# Patient Record
Sex: MALE | Race: WHITE | NOT HISPANIC OR LATINO | Employment: FULL TIME | URBAN - NONMETROPOLITAN AREA
[De-identification: names, ages, dates, MRNs, and addresses within clinical notes are randomized per-mention and may not be internally consistent; named-entity substitution may affect disease eponyms.]

---

## 2017-02-06 ENCOUNTER — TELEPHONE (OUTPATIENT)
Dept: URGENT CARE | Facility: CLINIC | Age: 22
End: 2017-02-06

## 2017-02-06 NOTE — TELEPHONE ENCOUNTER
Contacted patient regarding A1C results, a copy of results sent to family doctor Manuel TAVERAS in [place]. Verbalized understanding

## 2017-02-22 ENCOUNTER — TELEPHONE (OUTPATIENT)
Dept: URGENT CARE | Facility: CLINIC | Age: 22
End: 2017-02-22

## 2017-02-22 NOTE — TELEPHONE ENCOUNTER
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 Have him check to see if student health can do that to decrease patient cost.

## 2018-01-12 ENCOUNTER — APPOINTMENT (OUTPATIENT)
Dept: GENERAL RADIOLOGY | Facility: HOSPITAL | Age: 23
End: 2018-01-12

## 2018-01-12 ENCOUNTER — HOSPITAL ENCOUNTER (EMERGENCY)
Facility: HOSPITAL | Age: 23
Discharge: HOME | End: 2018-01-12
Attending: EMERGENCY MEDICINE | Admitting: EMERGENCY MEDICINE

## 2018-01-12 VITALS
BODY MASS INDEX: 33.75 KG/M2 | OXYGEN SATURATION: 100 % | DIASTOLIC BLOOD PRESSURE: 94 MMHG | HEIGHT: 66 IN | WEIGHT: 210 LBS | SYSTOLIC BLOOD PRESSURE: 141 MMHG | RESPIRATION RATE: 18 BRPM | TEMPERATURE: 97.9 F | HEART RATE: 107 BPM

## 2018-01-12 DIAGNOSIS — F10.129 HANGOVER WITH COMPLICATION (HCC): ICD-10-CM

## 2018-01-12 DIAGNOSIS — E86.0 DEHYDRATION: ICD-10-CM

## 2018-01-12 DIAGNOSIS — R11.2 NON-INTRACTABLE VOMITING WITH NAUSEA, UNSPECIFIED VOMITING TYPE: Primary | ICD-10-CM

## 2018-01-12 LAB
ALBUMIN SERPL-MCNC: 5.3 G/DL (ref 3.5–5)
ALBUMIN/GLOB SERPL: 1.7 G/DL (ref 1–2)
ALP SERPL-CCNC: 114 U/L (ref 38–126)
ALT SERPL W P-5'-P-CCNC: 49 U/L (ref 13–69)
ANION GAP SERPL CALCULATED.3IONS-SCNC: 19.7 MMOL/L
AST SERPL-CCNC: 28 U/L (ref 15–46)
BACTERIA UR QL AUTO: ABNORMAL /HPF
BASOPHILS # BLD AUTO: 0.06 10*3/MM3 (ref 0–0.2)
BASOPHILS NFR BLD AUTO: 0.3 % (ref 0–2.5)
BILIRUB SERPL-MCNC: 0.7 MG/DL (ref 0.2–1.3)
BILIRUB UR QL STRIP: NEGATIVE
BUN BLD-MCNC: 20 MG/DL (ref 7–20)
BUN/CREAT SERPL: 22.2 (ref 6.3–21.9)
CALCIUM SPEC-SCNC: 10.5 MG/DL (ref 8.4–10.2)
CHLORIDE SERPL-SCNC: 101 MMOL/L (ref 98–107)
CLARITY UR: CLEAR
CO2 SERPL-SCNC: 29 MMOL/L (ref 26–30)
COLOR UR: YELLOW
CREAT BLD-MCNC: 0.9 MG/DL (ref 0.6–1.3)
DEPRECATED RDW RBC AUTO: 38.5 FL (ref 37–54)
EOSINOPHIL # BLD AUTO: 0 10*3/MM3 (ref 0–0.7)
EOSINOPHIL NFR BLD AUTO: 0 % (ref 0–7)
ERYTHROCYTE [DISTWIDTH] IN BLOOD BY AUTOMATED COUNT: 11.9 % (ref 11.5–14.5)
GFR SERPL CREATININE-BSD FRML MDRD: 106 ML/MIN/1.73
GLOBULIN UR ELPH-MCNC: 3.2 GM/DL
GLUCOSE BLD-MCNC: 95 MG/DL (ref 74–98)
GLUCOSE UR STRIP-MCNC: NEGATIVE MG/DL
HCT VFR BLD AUTO: 47.2 % (ref 42–52)
HGB BLD-MCNC: 16.1 G/DL (ref 14–18)
HGB UR QL STRIP.AUTO: ABNORMAL
HYALINE CASTS UR QL AUTO: ABNORMAL /LPF
IMM GRANULOCYTES # BLD: 0.12 10*3/MM3 (ref 0–0.06)
IMM GRANULOCYTES NFR BLD: 0.5 % (ref 0–0.6)
KETONES UR QL STRIP: ABNORMAL
LEUKOCYTE ESTERASE UR QL STRIP.AUTO: NEGATIVE
LIPASE SERPL-CCNC: 61 U/L (ref 23–300)
LYMPHOCYTES # BLD AUTO: 1.12 10*3/MM3 (ref 0.6–3.4)
LYMPHOCYTES NFR BLD AUTO: 4.9 % (ref 10–50)
MCH RBC QN AUTO: 30.1 PG (ref 27–31)
MCHC RBC AUTO-ENTMCNC: 34.1 G/DL (ref 30–37)
MCV RBC AUTO: 88.2 FL (ref 80–94)
MONOCYTES # BLD AUTO: 0.7 10*3/MM3 (ref 0–0.9)
MONOCYTES NFR BLD AUTO: 3.1 % (ref 0–12)
MUCOUS THREADS URNS QL MICRO: ABNORMAL /HPF
NEUTROPHILS # BLD AUTO: 20.87 10*3/MM3 (ref 2–6.9)
NEUTROPHILS NFR BLD AUTO: 91.2 % (ref 37–80)
NITRITE UR QL STRIP: NEGATIVE
NRBC BLD MANUAL-RTO: 0 /100 WBC (ref 0–0)
PH UR STRIP.AUTO: 5.5 [PH] (ref 5–8)
PLATELET # BLD AUTO: 371 10*3/MM3 (ref 130–400)
PMV BLD AUTO: 9.2 FL (ref 6–12)
POTASSIUM BLD-SCNC: 4.7 MMOL/L (ref 3.5–5.1)
PROT SERPL-MCNC: 8.5 G/DL (ref 6.3–8.2)
PROT UR QL STRIP: ABNORMAL
RBC # BLD AUTO: 5.35 10*6/MM3 (ref 4.7–6.1)
RBC # UR: ABNORMAL /HPF
REF LAB TEST METHOD: ABNORMAL
SODIUM BLD-SCNC: 145 MMOL/L (ref 137–145)
SP GR UR STRIP: >=1.03 (ref 1–1.03)
SQUAMOUS #/AREA URNS HPF: ABNORMAL /HPF
UROBILINOGEN UR QL STRIP: ABNORMAL
WBC NRBC COR # BLD: 22.87 10*3/MM3 (ref 4.8–10.8)
WBC UR QL AUTO: ABNORMAL /HPF

## 2018-01-12 PROCEDURE — 81001 URINALYSIS AUTO W/SCOPE: CPT | Performed by: PHYSICIAN ASSISTANT

## 2018-01-12 PROCEDURE — 99283 EMERGENCY DEPT VISIT LOW MDM: CPT

## 2018-01-12 PROCEDURE — 80053 COMPREHEN METABOLIC PANEL: CPT | Performed by: PHYSICIAN ASSISTANT

## 2018-01-12 PROCEDURE — 83690 ASSAY OF LIPASE: CPT | Performed by: PHYSICIAN ASSISTANT

## 2018-01-12 PROCEDURE — 25010000002 ONDANSETRON PER 1 MG: Performed by: PHYSICIAN ASSISTANT

## 2018-01-12 PROCEDURE — 71046 X-RAY EXAM CHEST 2 VIEWS: CPT

## 2018-01-12 PROCEDURE — 96374 THER/PROPH/DIAG INJ IV PUSH: CPT

## 2018-01-12 PROCEDURE — 85025 COMPLETE CBC W/AUTO DIFF WBC: CPT | Performed by: PHYSICIAN ASSISTANT

## 2018-01-12 RX ORDER — ONDANSETRON 2 MG/ML
4 INJECTION INTRAMUSCULAR; INTRAVENOUS ONCE
Status: COMPLETED | OUTPATIENT
Start: 2018-01-12 | End: 2018-01-12

## 2018-01-12 RX ORDER — ONDANSETRON 4 MG/1
4 TABLET, ORALLY DISINTEGRATING ORAL EVERY 6 HOURS PRN
Qty: 10 TABLET | Refills: 0 | Status: SHIPPED | OUTPATIENT
Start: 2018-01-12 | End: 2018-05-25

## 2018-01-12 RX ORDER — SODIUM CHLORIDE 0.9 % (FLUSH) 0.9 %
10 SYRINGE (ML) INJECTION AS NEEDED
Status: DISCONTINUED | OUTPATIENT
Start: 2018-01-12 | End: 2018-01-12 | Stop reason: HOSPADM

## 2018-01-12 RX ADMIN — ONDANSETRON 4 MG: 2 INJECTION INTRAMUSCULAR; INTRAVENOUS at 15:32

## 2018-01-12 RX ADMIN — SODIUM CHLORIDE 1000 ML: 9 INJECTION, SOLUTION INTRAVENOUS at 15:32

## 2018-01-12 NOTE — ED PROVIDER NOTES
Subjective   HPI Comments: 22-year-old male with no significant health problems other than allergies.  He is here for generalized weakness and vomiting since last night after drinking 6 cocktails and getting drunk.  States that he vomited more than 10 times.  No blood in the vomitus.  No abdominal pain.  No diarrhea.  Still has nausea today.  No headache.  Symptoms are progressively worsening.    Aggravating factors: Eating or drinking.  Alleviating factors: None.  Treatment prior to arrival: Powerade.      History provided by:  Patient  History limited by: nothing.   used: No        Review of Systems   Constitutional: Positive for appetite change.        Generalized weakness   HENT: Negative.    Eyes: Negative.    Respiratory: Negative.    Cardiovascular: Negative.  Negative for chest pain.   Gastrointestinal: Positive for nausea and vomiting. Negative for abdominal pain.   Endocrine: Negative.    Genitourinary: Negative for dysuria.   Musculoskeletal: Negative.    Skin: Negative.    Allergic/Immunologic: Negative.    Neurological: Negative.    Hematological: Negative.    Psychiatric/Behavioral: Negative.    All other systems reviewed and are negative.      Past Medical History:   Diagnosis Date   • ADHD (attention deficit hyperactivity disorder)        No Known Allergies    Past Surgical History:   Procedure Laterality Date   • SHOULDER SURGERY Left    • TONSILLECTOMY         History reviewed. No pertinent family history.    Social History     Social History   • Marital status: Single     Spouse name: N/A   • Number of children: N/A   • Years of education: N/A     Social History Main Topics   • Smoking status: Never Smoker   • Smokeless tobacco: None   • Alcohol use Yes   • Drug use: Defer   • Sexual activity: Defer     Other Topics Concern   • None     Social History Narrative   • None           Objective   Physical Exam   Constitutional: He is oriented to person, place, and time. He appears  well-developed and well-nourished. No distress.   HENT:   Head: Normocephalic and atraumatic.   Right Ear: External ear normal.   Left Ear: External ear normal.   Oral mucosa is dry.   Eyes: EOM are normal. Pupils are equal, round, and reactive to light.   Neck: Normal range of motion. Neck supple.   Cardiovascular: Normal rate, regular rhythm and normal heart sounds.    Pulmonary/Chest: Effort normal and breath sounds normal. No stridor. He has no wheezes. He exhibits no tenderness.   Abdominal: Soft. He exhibits no distension. There is no tenderness. There is no rebound and no guarding.   Musculoskeletal: Normal range of motion. He exhibits no edema.   Neurological: He is alert and oriented to person, place, and time.   Skin: Skin is warm and dry. No rash noted. He is not diaphoretic.   Psychiatric: He has a normal mood and affect. His behavior is normal. Judgment and thought content normal.   Nursing note and vitals reviewed.      Procedures         ED Course  ED Course   Comment By Time   Heart rate 89 to 90.  Patient feels much better.  No longer nauseated or vomiting.  White count noted to be elevated.  Chest x-ray and urinalysis as well as lipase added on. Earlene Monae PA-C 01/12 1618   Laboratory evaluation noted.  Leukocytosis is noted.  Leukocytosis may be from vomiting.  The patient denies abdominal pain.  Negative chest x-ray and urinalysis.  Describes no infection symptoms.  Patient understands what to return for as I explained to return for signs and sx listed in the discharge instructions. Earlene Monae PA-C 01/12 1710   Abdomen reexamined.  No abdominal pain or abdominal tenderness. Earlene Monae PA-C 01/12 1729                  MDM  Number of Diagnoses or Management Options  Dehydration: new and requires workup  Hangover with complication: new and requires workup  Non-intractable vomiting with nausea, unspecified vomiting type: new and requires workup     Amount and/or Complexity of Data  Reviewed  Clinical lab tests: ordered and reviewed  Tests in the radiology section of CPT®: ordered and reviewed  Independent visualization of images, tracings, or specimens: yes    Risk of Complications, Morbidity, and/or Mortality  Presenting problems: moderate  Diagnostic procedures: low  Management options: moderate  General comments: Patient likely has a hangover effect from alcohol last night and dehydration.  He feels much improved following fluids and Zofran.  We will discharge with Zofran and instructions to return if worse.  He understands he has a trace of blood in his urine and will need to see [facility] for a repeat urinalysis at some point.  As mentioned in my note above, leukocytosis may be from vomiting as no signs of infection are noted in his urine, chest x-ray.  He describes no infection like symptoms.    Patient Progress  Patient progress: stable      Final diagnoses:   Non-intractable vomiting with nausea, unspecified vomiting type   Hangover with complication   Dehydration            Earlene Monae PA-C  01/12/18 1712       Earlene Monae PA-C  01/12/18 4601

## 2018-01-12 NOTE — DISCHARGE INSTRUCTIONS
Discontinue alcohol.    Return to the ER for severe abdominal pain, vomiting blood, passing blood through your bowels or having black tarry stool, intractable vomiting, new or worsening symptoms.    Increase fluids at home.    Follow-up with your doctor or student health tomorrow for reexamination.

## 2018-04-20 ENCOUNTER — HOSPITAL ENCOUNTER (EMERGENCY)
Facility: HOSPITAL | Age: 23
Discharge: HOME | End: 2018-04-21
Attending: STUDENT IN AN ORGANIZED HEALTH CARE EDUCATION/TRAINING PROGRAM | Admitting: STUDENT IN AN ORGANIZED HEALTH CARE EDUCATION/TRAINING PROGRAM

## 2018-04-20 DIAGNOSIS — S05.02XA CONJUNCTIVAL ABRASION, LEFT, INITIAL ENCOUNTER: Primary | ICD-10-CM

## 2018-04-20 PROCEDURE — 99283 EMERGENCY DEPT VISIT LOW MDM: CPT

## 2018-04-20 RX ORDER — ERYTHROMYCIN 5 MG/G
1 OINTMENT OPHTHALMIC ONCE
Status: COMPLETED | OUTPATIENT
Start: 2018-04-20 | End: 2018-04-21

## 2018-04-21 VITALS
WEIGHT: 201.2 LBS | HEART RATE: 80 BPM | DIASTOLIC BLOOD PRESSURE: 90 MMHG | TEMPERATURE: 98.7 F | OXYGEN SATURATION: 97 % | HEIGHT: 67 IN | RESPIRATION RATE: 16 BRPM | BODY MASS INDEX: 31.58 KG/M2 | SYSTOLIC BLOOD PRESSURE: 129 MMHG

## 2018-04-21 RX ORDER — ERYTHROMYCIN 5 MG/G
OINTMENT OPHTHALMIC
Qty: 3.5 G | Refills: 0 | Status: SHIPPED | OUTPATIENT
Start: 2018-04-21 | End: 2018-05-25

## 2018-04-21 RX ADMIN — ERYTHROMYCIN 1 APPLICATION: 5 OINTMENT OPHTHALMIC at 00:08

## 2018-04-21 NOTE — ED PROVIDER NOTES
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  Patient does report some pain and burning and initially he stated that his vision was a little cloudy but currently is normal.  He states it does not hurt that bad but feels more irritated than anything else.  No bleeding.            Review of Systems   All other systems reviewed and are negative.      Past Medical History:   Diagnosis Date   • ADHD (attention deficit hyperactivity disorder)    • Seasonal allergies        No Known Allergies    Past Surgical History:   Procedure Laterality Date   • SHOULDER SURGERY Left    • TONSILLECTOMY         Family History   Problem Relation Age of Onset   • No Known Problems Mother    • No Known Problems Father        Social History     Social History   • Marital status:      Social History Main Topics   • Smoking status: Never Smoker   • Smokeless tobacco: Never Used   • Alcohol use No   • Drug use: No   • Sexual activity: Defer     Other Topics Concern   • Not on file           Objective   Physical Exam   Nursing note and vitals reviewed.    GEN: No acute distress  Head: Normocephalic, atraumatic  Eyes: Pupils equal round reactive to light, extraocular movements are intact, with eyes deviated to the left on the lateral aspect of the left eye the conjunctiva is mildly erythematous with an abrasion is visible.  Mild signs of subconjunctival hemorrhage around the abrasion.  ENT: Posterior pharynx normal in appearance, oral mucosa is moist  Chest: Nontender to palpation  Cardiovascular: Regular rate  Lungs: Clear to auscultation bilaterally  Abdomen: Soft, nontender, nondistended, no peritoneal signs  Extremities: No edema, normal appearance  Neuro: GCS 15  Psych: Mood and affect are appropriate    Procedures         ED Course  ED Course                  MDM  Number of Diagnoses or Management Options  Conjunctival  abrasion, left, initial encounter:   Diagnosis management comments: Visual acuity grossly normal.  We will give the patient referral to ophthalmology on Monday if not improving.  We will treat with erythromycin eye ointment.      Final diagnoses:   Conjunctival abrasion, left, initial encounter            Stalin Mcmahon MD  04/21/18 0014

## 2018-05-28 VITALS
DIASTOLIC BLOOD PRESSURE: 78 MMHG | WEIGHT: 205.03 LBS | RESPIRATION RATE: 20 BRPM | HEART RATE: 100 BPM | SYSTOLIC BLOOD PRESSURE: 130 MMHG | HEIGHT: 67 IN | TEMPERATURE: 98.4 F | BODY MASS INDEX: 32.18 KG/M2 | OXYGEN SATURATION: 96 %

## 2018-05-28 PROCEDURE — 99283 EMERGENCY DEPT VISIT LOW MDM: CPT

## 2018-05-29 ENCOUNTER — HOSPITAL ENCOUNTER (EMERGENCY)
Facility: HOSPITAL | Age: 23
Discharge: HOME | End: 2018-05-29
Attending: EMERGENCY MEDICINE | Admitting: EMERGENCY MEDICINE

## 2018-05-29 DIAGNOSIS — B02.9 HERPES ZOSTER WITHOUT COMPLICATION: Primary | ICD-10-CM

## 2018-05-29 RX ORDER — VALACYCLOVIR HYDROCHLORIDE 1 G/1
1000 TABLET, FILM COATED ORAL 3 TIMES DAILY
Qty: 21 TABLET | Refills: 0 | OUTPATIENT
Start: 2018-05-29 | End: 2018-12-02 | Stop reason: HOSPADM

## 2018-05-29 RX ORDER — NAPROXEN 500 MG/1
500 TABLET ORAL 2 TIMES DAILY PRN
Qty: 20 TABLET | Refills: 0 | OUTPATIENT
Start: 2018-05-29 | End: 2018-12-02 | Stop reason: HOSPADM

## 2018-05-29 RX ORDER — IBUPROFEN 800 MG/1
800 TABLET ORAL ONCE
Status: COMPLETED | OUTPATIENT
Start: 2018-05-29 | End: 2018-05-29

## 2018-05-29 RX ORDER — VALACYCLOVIR HYDROCHLORIDE 500 MG/1
1000 TABLET, FILM COATED ORAL ONCE
Status: COMPLETED | OUTPATIENT
Start: 2018-05-29 | End: 2018-05-29

## 2018-05-29 RX ADMIN — IBUPROFEN 800 MG: 800 TABLET ORAL at 01:27

## 2018-05-29 RX ADMIN — VALACYCLOVIR 1000 MG: 500 TABLET, FILM COATED ORAL at 01:28

## 2018-12-02 ENCOUNTER — HOSPITAL ENCOUNTER (EMERGENCY)
Facility: HOSPITAL | Age: 23
Discharge: HOME | End: 2018-12-02
Attending: STUDENT IN AN ORGANIZED HEALTH CARE EDUCATION/TRAINING PROGRAM | Admitting: STUDENT IN AN ORGANIZED HEALTH CARE EDUCATION/TRAINING PROGRAM

## 2018-12-02 VITALS
RESPIRATION RATE: 16 BRPM | DIASTOLIC BLOOD PRESSURE: 75 MMHG | HEART RATE: 75 BPM | SYSTOLIC BLOOD PRESSURE: 128 MMHG | WEIGHT: 210 LBS | HEIGHT: 66 IN | BODY MASS INDEX: 33.75 KG/M2 | OXYGEN SATURATION: 100 % | TEMPERATURE: 98 F

## 2018-12-02 DIAGNOSIS — F10.920 ALCOHOLIC INTOXICATION WITHOUT COMPLICATION (HCC): Primary | ICD-10-CM

## 2018-12-02 DIAGNOSIS — R11.2 NON-INTRACTABLE VOMITING WITH NAUSEA, UNSPECIFIED VOMITING TYPE: ICD-10-CM

## 2018-12-02 PROCEDURE — 99283 EMERGENCY DEPT VISIT LOW MDM: CPT

## 2018-12-02 RX ORDER — PROMETHAZINE HYDROCHLORIDE 25 MG/1
25 SUPPOSITORY RECTAL ONCE
Status: DISCONTINUED | OUTPATIENT
Start: 2018-12-02 | End: 2018-12-02

## 2018-12-02 RX ORDER — ONDANSETRON 4 MG/1
4 TABLET, ORALLY DISINTEGRATING ORAL EVERY 6 HOURS PRN
Qty: 8 TABLET | Refills: 0 | Status: SHIPPED | OUTPATIENT
Start: 2018-12-02 | End: 2019-09-19 | Stop reason: ALTCHOICE

## 2018-12-02 RX ORDER — ONDANSETRON 4 MG/1
8 TABLET, ORALLY DISINTEGRATING ORAL ONCE
Status: COMPLETED | OUTPATIENT
Start: 2018-12-02 | End: 2018-12-02

## 2018-12-02 RX ADMIN — ONDANSETRON 8 MG: 4 TABLET, ORALLY DISINTEGRATING ORAL at 04:37

## 2018-12-02 NOTE — ED PROVIDER NOTES
Subjective   23-year-old male that presents with significant other with concerns for alcohol poisoning.  They went to a [email or web address] party earlier tonight where he had 4 bourbon drinks.  Patient's significant other states that he is been acting funny and became nauseous.  She was afraid he would have alcohol poisoning and die.  The patient is awake alert and despite being intoxicated is oriented            Review of Systems   All other systems reviewed and are negative.      Past Medical History:   Diagnosis Date   • ADHD (attention deficit hyperactivity disorder)    • Seasonal allergies        No Known Allergies    Past Surgical History:   Procedure Laterality Date   • SHOULDER SURGERY Left    • TONSILLECTOMY         Family History   Problem Relation Age of Onset   • No Known Problems Mother    • No Known Problems Father        Social History     Socioeconomic History   • Marital status:      Spouse name: Not on file   • Number of children: Not on file   • Years of education: Not on file   • Highest education level: Not on file   Tobacco Use   • Smoking status: Never Smoker   • Smokeless tobacco: Never Used   Substance and Sexual Activity   • Alcohol use: No   • Drug use: No   • Sexual activity: Defer           Objective   Physical Exam   Nursing note and vitals reviewed.  .  GEN: No acute distress  Head: Normocephalic, atraumatic  Eyes: Pupils equal round reactive to light  ENT: Posterior pharynx normal in appearance, oral mucosa is moist  Chest: Nontender to palpation  Cardiovascular: Regular rate  Lungs: Clear to auscultation bilaterally  Abdomen: Soft, nontender, nondistended, no peritoneal signs  Extremities: No edema, normal appearance  Neuro: GCS 15  Psych: Pleasantly intoxicated    Procedures           ED Course                  MDM  Number of Diagnoses or Management Options  Alcoholic intoxication without complication (CMS/HCC):   Non-intractable vomiting with nausea, unspecified vomiting type:    Diagnosis management comments: Pt does appear intoxicated but is in no life-threatening distress.  Did give the patient oral Zofran.  Did  the patient and his significant other on the nature of alcohol toxicity.       Amount and/or Complexity of Data Reviewed  Decide to obtain previous medical records or to obtain history from someone other than the patient: yes  Obtain history from someone other than the patient: yes          Final diagnoses:   Alcoholic intoxication without complication (CMS/HCC)   Non-intractable vomiting with nausea, unspecified vomiting type            Stalin Mcmahon MD  12/02/18 0606

## 2019-07-13 ENCOUNTER — HOSPITAL ENCOUNTER (EMERGENCY)
Facility: HOSPITAL | Age: 24
Discharge: HOME | End: 2019-07-13
Attending: EMERGENCY MEDICINE | Admitting: EMERGENCY MEDICINE

## 2019-07-13 VITALS
RESPIRATION RATE: 16 BRPM | HEART RATE: 88 BPM | SYSTOLIC BLOOD PRESSURE: 150 MMHG | OXYGEN SATURATION: 97 % | HEIGHT: 67 IN | DIASTOLIC BLOOD PRESSURE: 92 MMHG | TEMPERATURE: 98.1 F | WEIGHT: 220 LBS | BODY MASS INDEX: 34.53 KG/M2

## 2019-07-13 DIAGNOSIS — L25.8 CONTACT DERMATITIS DUE TO OTHER AGENT, UNSPECIFIED CONTACT DERMATITIS TYPE: Primary | ICD-10-CM

## 2019-07-13 PROCEDURE — 99283 EMERGENCY DEPT VISIT LOW MDM: CPT

## 2019-07-13 PROCEDURE — 63710000001 PREDNISONE PER 1 MG: Performed by: PHYSICIAN ASSISTANT

## 2019-07-13 RX ORDER — CETIRIZINE HYDROCHLORIDE 10 MG/1
10 TABLET ORAL DAILY
Qty: 14 TABLET | Refills: 0 | Status: SHIPPED | OUTPATIENT
Start: 2019-07-13 | End: 2019-09-19 | Stop reason: ALTCHOICE

## 2019-07-13 RX ORDER — PREDNISONE 20 MG/1
40 TABLET ORAL ONCE
Status: COMPLETED | OUTPATIENT
Start: 2019-07-13 | End: 2019-07-13

## 2019-07-13 RX ORDER — CETIRIZINE HYDROCHLORIDE 10 MG/1
10 TABLET ORAL ONCE
Status: COMPLETED | OUTPATIENT
Start: 2019-07-13 | End: 2019-07-13

## 2019-07-13 RX ORDER — FENOPROFEN CALCIUM 200 MG
CAPSULE ORAL 2 TIMES DAILY
Qty: 118 ML | Refills: 0 | Status: SHIPPED | OUTPATIENT
Start: 2019-07-13 | End: 2019-07-18

## 2019-07-13 RX ADMIN — PREDNISONE 40 MG: 20 TABLET ORAL at 18:05

## 2019-07-13 RX ADMIN — CETIRIZINE HYDROCHLORIDE 10 MG: 10 TABLET, FILM COATED ORAL at 18:05

## 2019-07-13 NOTE — ED PROVIDER NOTES
Subjective   Patient is here with complaint of some rash to the right forearm since 09 100 this morning has cleansed the skin since then, feels this may be related to some chemical he was using yesterday but is uncertain presents here for further evaluation this is not a pruritic rash no fevers chills no other systemic complaints and localized to the dorsal aspect right forearm in  separate areas        History provided by:  Patient and spouse      Review of Systems   Constitutional: Negative.    HENT: Negative.    Respiratory: Negative.    Cardiovascular: Negative.    Gastrointestinal: Negative.    Musculoskeletal: Negative.    Skin: Positive for rash.   Neurological: Negative.    All other systems reviewed and are negative.      Past Medical History:   Diagnosis Date   • ADHD (attention deficit hyperactivity disorder)    • Seasonal allergies        No Known Allergies    Past Surgical History:   Procedure Laterality Date   • SHOULDER SURGERY Left    • TONSILLECTOMY         Family History   Problem Relation Age of Onset   • No Known Problems Mother    • No Known Problems Father        Social History     Socioeconomic History   • Marital status:      Spouse name: Not on file   • Number of children: Not on file   • Years of education: Not on file   • Highest education level: Not on file   Tobacco Use   • Smoking status: Never Smoker   • Smokeless tobacco: Never Used   Substance and Sexual Activity   • Alcohol use: No   • Drug use: No   • Sexual activity: Defer           Objective   Physical Exam   Constitutional: He is oriented to person, place, and time. He appears well-developed and well-nourished.   Afebrile nontoxic no acute distress   HENT:   Head: Normocephalic and atraumatic.   Eyes: Conjunctivae and EOM are normal. Pupils are equal, round, and reactive to light.   Neck: Normal range of motion.   Cardiovascular: Normal rate and regular rhythm.   Pulmonary/Chest: Effort normal and breath sounds normal.    Musculoskeletal: Normal range of motion.   Neurological: He is alert and oriented to person, place, and time. No cranial nerve deficit or sensory deficit. He exhibits normal muscle tone. Coordination normal.   Skin: Skin is warm and dry. Rash noted. No pallor.   Localized slight erythematous lesions x 3 dorsum r forearm   Psychiatric: He has a normal mood and affect. His behavior is normal. Judgment and thought content normal.   Nursing note and vitals reviewed.      Procedures           ED Course  ED Course as of Jul 13 1758   Sat Jul 13, 2019 1756 We will plan on treating for dermatitis steroids Benadryl topical creams of the same follow-up with PCP return here for any worsening symptoms or concerns otherwise  [SC]      ED Course User Index  [SC] Kevin Iqbal PA-C                  MDM  Number of Diagnoses or Management Options     Amount and/or Complexity of Data Reviewed  Review and summarize past medical records: yes  Discuss the patient with other providers: yes    Risk of Complications, Morbidity, and/or Mortality  Presenting problems: low  Diagnostic procedures: low  Management options: low          Final diagnoses:   Contact dermatitis due to other agent, unspecified contact dermatitis type            Kevin Iqbal PA-C  07/13/19 1758       Kevin Iqbal PA-C  07/13/19 1758

## 2020-02-18 ENCOUNTER — HOSPITAL ENCOUNTER (EMERGENCY)
Facility: HOSPITAL | Age: 25
Discharge: HOME | End: 2020-02-18
Attending: STUDENT IN AN ORGANIZED HEALTH CARE EDUCATION/TRAINING PROGRAM | Admitting: STUDENT IN AN ORGANIZED HEALTH CARE EDUCATION/TRAINING PROGRAM

## 2020-02-18 VITALS
HEART RATE: 83 BPM | TEMPERATURE: 98.3 F | OXYGEN SATURATION: 97 % | SYSTOLIC BLOOD PRESSURE: 131 MMHG | BODY MASS INDEX: 33.34 KG/M2 | WEIGHT: 212.4 LBS | DIASTOLIC BLOOD PRESSURE: 90 MMHG | RESPIRATION RATE: 18 BRPM | HEIGHT: 67 IN

## 2020-02-18 DIAGNOSIS — R21 RASH: Primary | ICD-10-CM

## 2020-02-18 PROCEDURE — 99282 EMERGENCY DEPT VISIT SF MDM: CPT

## 2020-02-18 RX ORDER — PREDNISONE 20 MG/1
40 TABLET ORAL DAILY
Qty: 10 TABLET | Refills: 0 | Status: SHIPPED | OUTPATIENT
Start: 2020-02-18 | End: 2020-02-23

## 2020-02-18 RX ORDER — ACYCLOVIR 400 MG/1
800 TABLET ORAL
Qty: 70 TABLET | Refills: 0 | Status: SHIPPED | OUTPATIENT
Start: 2020-02-18 | End: 2020-02-25

## 2020-02-19 NOTE — ED PROVIDER NOTES
Subjective   This patient states he has had a rash to his left posterior shoulder area which is pruritic and burning.  He states he had shingles before and this feels like that.  He denies rash anywhere else.  No fever.  No new soaps or detergents.          Review of Systems   Constitutional: Negative.    HENT: Negative.    Eyes: Negative.    Respiratory: Negative.    Cardiovascular: Negative.    Gastrointestinal: Negative.    Genitourinary: Negative.    Musculoskeletal: Negative.    Skin:        Rash to the left posterior shoulder area   Allergic/Immunologic: Negative.    Neurological: Negative.    Psychiatric/Behavioral: Negative.    All other systems reviewed and are negative.      Past Medical History:   Diagnosis Date   • ADHD (attention deficit hyperactivity disorder)    • Seasonal allergies        No Known Allergies    Past Surgical History:   Procedure Laterality Date   • SHOULDER SURGERY Left    • TONSILLECTOMY         Family History   Problem Relation Age of Onset   • No Known Problems Mother    • No Known Problems Father        Social History     Socioeconomic History   • Marital status:      Spouse name: Not on file   • Number of children: Not on file   • Years of education: Not on file   • Highest education level: Not on file   Tobacco Use   • Smoking status: Never Smoker   • Smokeless tobacco: Never Used   Substance and Sexual Activity   • Alcohol use: No   • Drug use: No   • Sexual activity: Defer           Objective   Physical Exam   Constitutional: He appears well-developed and well-nourished.   HENT:   Head: Normocephalic and atraumatic.   Neck: Normal range of motion.   Cardiovascular: Normal rate.   Pulmonary/Chest: Effort normal.   Musculoskeletal: Normal range of motion.   Neurological: He is alert.   Skin:   Rash to the left posterior shoulder area somewhat pustular/vesicular.  No cellulitis or abscess.   Psychiatric: He has a normal mood and affect. His behavior is normal.   Nursing  note and vitals reviewed.      Procedures           ED Course                                         MDM  Although rash is not entirely consistent with herpes zoster given patient's concern for this and stating this is similar to past zoster episodes will treat with prednisone and acyclovir.  Final diagnoses:   Rash            Nasir Marquez PA-C  02/18/20 215

## 2020-07-04 ENCOUNTER — TELEMEDICINE (OUTPATIENT)
Dept: FAMILY MEDICINE CLINIC | Facility: TELEHEALTH | Age: 25
End: 2020-07-04

## 2020-07-04 DIAGNOSIS — Z87.438 HISTORY OF BALANITIS: Primary | ICD-10-CM

## 2020-07-04 PROCEDURE — 99213 OFFICE O/P EST LOW 20 MIN: CPT | Performed by: NURSE PRACTITIONER

## 2020-07-05 RX ORDER — CLOTRIMAZOLE 1 %
CREAM (GRAM) TOPICAL 2 TIMES DAILY
Qty: 28 G | Refills: 0 | Status: SHIPPED | OUTPATIENT
Start: 2020-07-05 | End: 2020-07-19

## 2020-07-05 NOTE — PROGRESS NOTES
CHIEF COMPLAINT  Chief Complaint   Patient presents with   • Rash         HPI  Shilo Kamara is a 24 y.o. male  presents with complaint of lesion on penis. Reports has been there for appx 1 week. Reports the areas on the head of his penis. Reports he has a history of balanitis in the past and was given some cream to help it. Reports he is out of the cream. Denies any discharge, itching, burning or blisters on his penis. Reports the areas are small circular and scaly. Denies any possibility of STD and has not had any new sexual contacts. Denies any pain at site    Review of Systems   Constitutional: Negative.  Negative for chills and fever.   HENT: Negative.    Eyes: Negative.    Respiratory: Negative.    Cardiovascular: Negative.    Gastrointestinal: Negative.    Endocrine: Negative.    Genitourinary: Negative for difficulty urinating, discharge, dysuria, frequency, penile pain, penile swelling, scrotal swelling, testicular pain and urgency.   Musculoskeletal: Negative.    Skin: Negative.    Allergic/Immunologic: Negative.    Neurological: Negative.    Hematological: Negative.    Psychiatric/Behavioral: Negative.        Past Medical History:   Diagnosis Date   • ADHD (attention deficit hyperactivity disorder)    • Hx of balanitis     1 year ago   • Seasonal allergies        Family History   Problem Relation Age of Onset   • No Known Problems Mother    • No Known Problems Father        Social History     Socioeconomic History   • Marital status:      Spouse name: Not on file   • Number of children: Not on file   • Years of education: Not on file   • Highest education level: Not on file   Tobacco Use   • Smoking status: Never Smoker   • Smokeless tobacco: Never Used   Substance and Sexual Activity   • Alcohol use: No   • Drug use: No   • Sexual activity: Defer         There were no vitals taken for this visit.    PHYSICAL EXAM  Physical Exam   Constitutional: He is oriented to person, place, and time. He  appears well-developed and well-nourished. No distress.   HENT:   Head: Normocephalic and atraumatic.   Right Ear: Hearing and external ear normal.   Left Ear: Hearing and external ear normal.   Nose: Nose normal.   Mouth/Throat: Mouth/Lips are normal.  Eyes: Conjunctivae and lids are normal.   Neck: Neck normal appearance.  Pulmonary/Chest: Effort normal. No stridor.  No respiratory distress.  Abdominal: Abdomen appears normal. He exhibits no distension.   Musculoskeletal: Normal range of motion.   Neurological: He is alert and oriented to person, place, and time. He has normal strength.   Skin: Skin is warm and dry. Lesion noted.   Patient reports he has 3 small flat, mildly red circular shaped lesions on head of penis. Denies blisters. No drainage. Pt examined self prior to visit.    Psychiatric: He has a normal mood and affect. His speech is normal and behavior is normal.       Results for orders placed or performed during the hospital encounter of 01/20/18   POCT Influenza A/B   Result Value Ref Range    Rapid Influenza A Ag Neg     Rapid Influenza B Ag Neg     Internal Control Passed Passed    Lot Number [id number]     Expiration Date 5-14-20        Shilo was seen today for rash.    Diagnoses and all orders for this visit:    History of balanitis    Other orders  -     clotrimazole (LOTRIMIN) 1 % cream; Apply  topically to the appropriate area as directed 2 (Two) Times a Day for 14 days.        **if at any time, experiences fever AND/OR worsening cough AND/OR shortness of breath, has been advised to go to nearest urgent care or emergency department for evaluation AND/OR testing        FOLLOW-UP  Follow-up with your primary physician next week.  Urgent Care/Emergency Department if worsening of symptoms    Patient verbalizes understanding of medication dosage, comfort measures, instructions for treatment and follow-up.    GILBERT Fitzgerald  07/04/2020  10:15 PM    This visit was performed via Telehealth.   This patient has been instructed to follow-up with their primary care provider if their symptoms worsen or the treatment provided does not resolve their illness.

## 2020-07-05 NOTE — PATIENT INSTRUCTIONS
Balanitis    Balanitis is swelling and irritation (inflammation) of the head of the penis (glans penis). The condition may also cause inflammation of the skin around the glans penis (foreskin) in men who have not been circumcised. It may develop because of an infection or another medical condition.  Balanitis occurs most often among men who have not had their foreskin removed (uncircumcised men). Balanitis sometimes causes scarring of the penis or foreskin, which can require surgery. Untreated balanitis can increase the risk of penile cancer.  What are the causes?  Common causes of this condition include:  · Poor personal hygiene, especially in uncircumcised men. Not cleaning the glans penis and foreskin well can result in buildup of bacteria, viruses, and yeast, which can lead to infection and inflammation.  · Irritation and lack of air flow due to fluid (smegma) that can build up on the glans penis.  Other causes include:  · Chemical irritation from products such as soaps or shower gels (especially those that have fragrance), condoms, personal lubricants, petroleum jelly, spermicides, or fabric softeners.  · Skin conditions, such as eczema, dermatitis, and psoriasis.  · Allergies to medicines, such as tetracycline and sulfa drugs.  · Certain medical conditions, including liver cirrhosis, congestive heart failure, diabetes, and kidney disease.  · Infections, such as candidiasis, HPV (human papillomavirus), herpes simplex, gonorrhea, and syphilis.  · Severe obesity.  What increases the risk?  The following factors may make you more likely to develop this condition:  · Having diabetes. This is the most common risk factor.  · Having a tight foreskin that is difficult to pull back (retract) past the glans.  · Having sexual intercourse without using a condom.  What are the signs or symptoms?  Symptoms of this condition include:  · Discharge from under the foreskin.  · A bad smell.  · Pain or difficulty retracting the  foreskin.  · Tenderness, redness, and swelling of the glans.  · A rash or sores on the glans or foreskin.  · Itchiness.  · Inability to get an erection due to pain.  · Difficulty urinating.  · Scarring of the penis or foreskin, in some cases.  How is this diagnosed?  This condition may be diagnosed based on:  · A physical exam.  · Testing a swab of discharge to check for bacterial or fungal infection.  · Blood tests:  ? To check for viruses that can cause balanitis.  ? To check your blood sugar (glucose) level. High blood glucose could be a sign of diabetes, which can cause balanitis.  How is this treated?  Treatment for balanitis depends on the cause. Treatment may include:  · Improving personal hygiene. Your health care provider may recommend sitting in a bath of warm water that is deep enough to cover your hips and buttocks (sitz bath).  · Medicines such as:  ? Creams or ointments to reduce swelling (steroids) or to treat an infection.  ? Antibiotic medicine.  ? Antifungal medicine.  · Surgery to remove or cut the foreskin (circumcision). This may be done if you have scarring on the foreskin that makes it difficult to retract.  · Controlling other medical problems that may be causing your condition or making it worse.  Follow these instructions at home:  · Do not have sex until the condition clears up, or until your health care provider approves.  · Keep your penis clean and dry. Take sitz baths as recommended by your health care provider.  · Avoid products that irritate your skin or make symptoms worse, such as soaps and shower gels that have fragrance.  · Take over-the-counter and prescription medicines only as told by your health care provider.  ? If you were prescribed an antibiotic medicine or a cream or ointment, use it as told by your health care provider. Do not stop using your medicine, cream, or ointment even if you start to feel better.  ? Do not drive or use heavy machinery while taking prescription  pain medicine.  Contact a health care provider if:  · Your symptoms get worse or do not improve with home care.  · You develop chills or a fever.  · You have trouble urinating.  · You cannot retract your foreskin.  Get help right away if:  · You develop severe pain.  · You are unable to urinate.  Summary  · Balanitis is inflammation of the head of the penis (glans penis) caused by irritation or infection.  · Balanitis causes pain, redness, and swelling of the glans penis.  · This condition is most common among uncircumcised men who do not keep their glans penis clean and in men who have diabetes.  · Treatment may include creams or ointments.  · Good hygiene is important for prevention. This includes pulling back the foreskin when washing your penis.  This information is not intended to replace advice given to you by your health care provider. Make sure you discuss any questions you have with your health care provider.  Document Released: 05/06/2010 Document Revised: 11/30/2018 Document Reviewed: 11/06/2017  Elsevier Patient Education © 2020 [employer] Inc[employer]

## 2020-07-31 PROCEDURE — U0002 COVID-19 LAB TEST NON-CDC: HCPCS | Performed by: NURSE PRACTITIONER

## 2020-07-31 PROCEDURE — U0004 COV-19 TEST NON-CDC HGH THRU: HCPCS | Performed by: NURSE PRACTITIONER

## 2020-10-15 PROCEDURE — U0004 COV-19 TEST NON-CDC HGH THRU: HCPCS | Performed by: NURSE PRACTITIONER

## 2020-11-13 ENCOUNTER — OFFICE VISIT (OUTPATIENT)
Dept: PSYCHIATRY | Facility: CLINIC | Age: 25
End: 2020-11-13

## 2020-11-13 VITALS
BODY MASS INDEX: 35.2 KG/M2 | HEIGHT: 66 IN | WEIGHT: 219 LBS | TEMPERATURE: 98 F | HEART RATE: 85 BPM | SYSTOLIC BLOOD PRESSURE: 138 MMHG | RESPIRATION RATE: 18 BRPM | DIASTOLIC BLOOD PRESSURE: 84 MMHG

## 2020-11-13 DIAGNOSIS — E66.01 MORBIDLY OBESE (HCC): ICD-10-CM

## 2020-11-13 DIAGNOSIS — Z79.899 MEDICATION MANAGEMENT: Primary | ICD-10-CM

## 2020-11-13 DIAGNOSIS — F90.2 ADHD (ATTENTION DEFICIT HYPERACTIVITY DISORDER), COMBINED TYPE: ICD-10-CM

## 2020-11-13 LAB
AMPHETAMINE CUT-OFF: NORMAL
BENZODIAZIPINE CUT-OFF: NORMAL
BUPRENORPHINE CUT-OFF: NORMAL
COCAINE CUT-OFF: NORMAL
EXTERNAL AMPHETAMINE SCREEN URINE: NEGATIVE
EXTERNAL BENZODIAZEPINE SCREEN URINE: NEGATIVE
EXTERNAL BUPRENORPHINE SCREEN URINE: NEGATIVE
EXTERNAL COCAINE SCREEN URINE: NEGATIVE
EXTERNAL MDMA: NEGATIVE
EXTERNAL METHADONE SCREEN URINE: NEGATIVE
EXTERNAL METHAMPHETAMINE SCREEN URINE: NEGATIVE
EXTERNAL OPIATES SCREEN URINE: NEGATIVE
EXTERNAL OXYCODONE SCREEN URINE: NEGATIVE
EXTERNAL THC SCREEN URINE: NEGATIVE
MDMA CUT-OFF: NORMAL
METHADONE CUT-OFF: NORMAL
METHAMPHETAMINE CUT-OFF: NORMAL
OPIATES CUT-OFF: NORMAL
OXYCODONE CUT-OFF: NORMAL
THC CUT-OFF: NORMAL

## 2020-11-13 PROCEDURE — 90792 PSYCH DIAG EVAL W/MED SRVCS: CPT | Performed by: NURSE PRACTITIONER

## 2020-11-13 RX ORDER — DEXTROAMPHETAMINE SACCHARATE, AMPHETAMINE ASPARTATE, DEXTROAMPHETAMINE SULFATE AND AMPHETAMINE SULFATE 2.5; 2.5; 2.5; 2.5 MG/1; MG/1; MG/1; MG/1
10 TABLET ORAL DAILY
Qty: 30 TABLET | Refills: 0 | Status: SHIPPED | OUTPATIENT
Start: 2020-11-13 | End: 2020-12-23

## 2020-11-13 NOTE — PROGRESS NOTES
"Subjective   Shilo Kamara is a 25 y.o. male who presents today for initial evaluation     Chief Complaint:  Poor focus and attention    History of Present Illness:  Shilo is a 25-year-old,  male who presents for an initial evaluation by this provider. Shilo was referred by his primary care physician, Dr. Hood. Shilo states \"I am having a hard time with my focus and concentration.\"  Shilo tells me that he was diagnosed with ADHD at the age of [age] years.  He received treatment with an unknown medication until the age of 17 years.  He tells me he believes he took Adderall.  He tells me he elected to stop taking medication at age 17 but has found recent difficulties due to a return of symptoms.  Shilo tells me that his wife has said, \"I can't even have a conversation with you, you don't focus or pay attention.\"  Shilo is in his last semester of his bachelor's degree.  He will be receiving a major in [employer] [place] security with a minor in disaster management and a certification in intelligence with a concentration of threat specialist.  Shilo hopes to eventually work for the [employer]. The patient endorses symptoms of ADHD including, but not limited to: complains of poor focus, concentration, and attention, as well as difficulty with memory and staying on task.  He states that online school has only \"worsened\" the problem.  Shilo also finds himself avoiding activities that require sustained attention. Shilo also endorses making careless mistakes at work and on his school assignments.  During the visit, Shilo rings his hands and moves about in his seat.  He complains of being talkative and intrusive at times.   Shilo denies depressive symptoms but states he is having a hard time with his motivation, low energy, fatigue, and poor appetite.  Shilo discusses having anxious symptoms as well.  He states that he worries a lot with increased heart rate and poor focus.  Shilo states he worries " "more when he has \"a lot to do with school and not enough time because I am working full-time.\"  He denies any problems with sleep.  He denies any SI/HI/AVH.    Past Psychiatric History: As mentioned above, Shilo was diagnosed with ADHD, combined type at [age] years old.  He took an unknown medication until the age of 17 at which time his doctor \"weaned\" him from the medication per his request.  Shilo tells me that, for a brief period during childhood, he saw a psychiatrist but his family doctor mostly took care of his medication management.  He denies any psychiatric hospitalizations or inpatient treatments.    Substance Use/Abuse: Shilo endorses using alcohol \"occasionally.\"  He also states that two years ago he quit \"chewing tobacco.\"  He denies any cravings for alcohol or tobacco products.  He denies any legal problems associated with his alcohol use.  He denies any blackouts/withdrawal/DTs from his alcohol use.    Family Psychiatric History: Shilo states \"I do not really know if my family has any psychiatric problems.\"  He believes that the only condition in his family is Alzheimer's Disease in his maternal grandmother.    Developmental History: Shilo was born in [place], [place] prematurely.  He believes he was about four weeks early.  He spent 6 weeks in a  intensive care unit.  He believes he was born via .  During school, Shilo had to take special education classes.  He denies having any problems making or keeping friends and tells me he had a best friend throughout school..  He denies any disciplinary problems during school.  He denies having any significant relationships male or female during high school.  He was able to graduate high school and is currently enrolled in his bachelor's program for [employer] security.  Shilo was raised by his mom until the age of 10 when she remarried and both his mom and step-dad raised him.  Shilo tells me \"my dad was never in the " "picture.\"  Shilo has one stepbrother who is in senior care and one half brother whom he is close with.  Shilo tells me he has a good relationship with both his biological mother and his stepfather.  Shilo has been  for the last 3-1/2 years.  He has no biological children.    Social History: Shilo lives in [place], [place] with his spouse.  He is working full-time at [employer].  He is also a full-time student.  He has been  for the last 3-1/2 years.  He tells me that his wife is going to enroll in medical school but needs to complete her MCATs first.  Shilo enjoys watching sports, hunting, spending time with friends, and hiking.  He denies any current tobacco/illicit drug use.  He does occasionally drink alcohol.      The following portions of the patient's history were reviewed and updated as appropriate: allergies, current medications, past family history, past medical history, past social history, past surgical history and problem list.    Past Medical History:  Past Medical History:   Diagnosis Date   • ADHD (attention deficit hyperactivity disorder)    • Hx of balanitis     1 year ago   • Seasonal allergies        Social History:  Social History     Socioeconomic History   • Marital status:      Spouse name: Not on file   • Number of children: Not on file   • Years of education: Not on file   • Highest education level: Not on file   Tobacco Use   • Smoking status: Never Smoker   • Smokeless tobacco: Never Used   Substance and Sexual Activity   • Alcohol use: Yes     Comment: SOCIALLY   • Drug use: No   • Sexual activity: Defer       Family History:  Family History   Problem Relation Age of Onset   • No Known Problems Mother    • No Known Problems Father    • Dementia Maternal Grandmother    • Drug abuse Cousin    • ADD / ADHD Neg Hx    • Alcohol abuse Neg Hx    • Anxiety disorder Neg Hx    • Bipolar disorder Neg Hx    • Depression Neg Hx    • OCD Neg Hx    • Paranoid behavior Neg Hx    "   • Schizophrenia Neg Hx    • Seizures Neg Hx    • Self-Injurious Behavior  Neg Hx    • Suicide Attempts Neg Hx        Past Surgical History:  Past Surgical History:   Procedure Laterality Date   • SHOULDER SURGERY Left    • SHOULDER SURGERY Left    • TONSILLECTOMY         Problem List:  Patient Active Problem List   Diagnosis   • Morbidly obese (CMS/HCC)       Allergy:   No Known Allergies     Current Medications:   Current Outpatient Medications   Medication Sig Dispense Refill   • albuterol sulfate  (90 Base) MCG/ACT inhaler Inhale 2 puffs Every 6 (Six) Hours As Needed for Wheezing or Shortness of Air. 1 inhaler 0   • fluticasone (FLONASE) 50 MCG/ACT nasal spray 1-2 sprays each nostril daily 1 bottle 6   • Loratadine 10 MG capsule Take  by mouth.     • amphetamine-dextroamphetamine (Adderall) 10 MG tablet Take 1 tablet by mouth Daily. 30 tablet 0     No current facility-administered medications for this visit.        Review of Symptoms:    Review of Systems   Constitutional: Positive for fatigue. Negative for chills, fever, unexpected weight gain and unexpected weight loss.   HENT: Negative.    Eyes: Negative.    Respiratory: Negative for cough and shortness of breath.    Cardiovascular: Negative for chest pain and palpitations.   Gastrointestinal: Negative for abdominal pain, constipation, diarrhea, vomiting and indigestion.   Musculoskeletal: Negative for arthralgias, gait problem and joint swelling.   Skin: Negative.    Allergic/Immunologic: Negative.    Neurological: Negative for dizziness, speech difficulty, weakness, memory problem and confusion.   Psychiatric/Behavioral: Positive for decreased concentration and stress. Negative for behavioral problems, sleep disturbance, suicidal ideas and depressed mood. The patient is nervous/anxious.        PHQ-9 Score:   PHQ-9 Total Score: 0     Physical Exam:   Blood pressure 138/84, pulse 85, temperature 98 °F (36.7 °C), temperature source Infrared, resp. rate  "18, height 167.6 cm (66\"), weight 99.3 kg (219 lb). Body mass index is 35.35 kg/m².     Physical Exam  Vitals signs and nursing note reviewed.   Constitutional:       Appearance: He is well-developed.   Musculoskeletal: Normal range of motion.   Skin:     General: Skin is warm and dry.   Neurological:      Mental Status: He is alert and oriented to person, place, and time.   Psychiatric:         Attention and Perception: He is inattentive. He does not perceive auditory or visual hallucinations.         Mood and Affect: Mood is anxious.         Speech: Speech normal.         Behavior: Behavior normal.         Thought Content: Thought content normal.         Cognition and Memory: Cognition normal.         Judgment: Judgment normal.          Appearance: Well-developed, overweight, appears stated age, and NAD  Gait, Station, Strength: WNL    Patient's Support Network Includes:  wife    Functional Status: Moderate impairment     Progress toward goal: Not at goal    Prognosis: Guarded with Ongoing Treatment    Mental Status Exam:   Hygiene:   good  Cooperation:  Cooperative  Eye Contact:  Good  Psychomotor Behavior:  Restless  Affect:  Full range  Mood: anxious  Hopelessness: Denies  Speech:  Normal  Thought Process:  Goal directed and Linear  Thought Content:  Normal  Suicidal:  None  Homicidal:  None  Hallucinations:  None  Delusion:  None  Memory:  Intact  Orientation:  Person, Place, Time and Situation  Reliability:  good  Insight:  Good  Judgement:  Good  Impulse Control:  Good  Physical/Medical Issues:  No      Lab Results:   Office Visit on 11/13/2020   Component Date Value Ref Range Status   • External Amphetamine Screen Urine 11/13/2020 Negative   Final   • Amphetamine Cut-Off 11/13/2020 1000 NG/ML   Final   • External Benzodiazepine Screen Uri* 11/13/2020 Negative   Final   • Benzodiazipine Cut-Off 11/13/2020 300 NG/ML   Final   • External Cocaine Screen Urine 11/13/2020 Negative   Final   • Cocaine Cut-Off " 11/13/2020 30 NG/ML   Final   • External THC Screen Urine 11/13/2020 Negative   Final   • THC Cut-Off 11/13/2020 50 NG/ML   Final   • External Methadone Screen Urine 11/13/2020 Negative   Final   • Methadone Cut-Off 11/13/2020 300 NG/ML   Final   • External Methamphetamine Screen Ur* 11/13/2020 Negative   Final   • Methamphetamine Cut-Off 11/13/2020 1000 NG/ML   Final   • External Oxycodone Screen Urine 11/13/2020 Negative   Final   • Oxycodone Cut-Off 11/13/2020 100 NG/ML   Final   • External Buprenorphine Screen Urine 11/13/2020 Negative   Final   • Buprenorphine Cut-Off 11/13/2020 10 NG/ML   Final   • External MDMA 11/13/2020 Negative   Final   • MDMA Cut-Off 11/13/2020 500 NG/ML   Final   • External Opiates Screen Urine 11/13/2020 Negative   Final   • Opiates Cut-Off 11/13/2020 300 NG/ML   Final   Admission on 10/15/2020, Discharged on 10/15/2020   Component Date Value Ref Range Status   • SARS-CoV-2 SPEEDY 10/15/2020 Not Detected  Not Detected Final   • Rapid Influenza A Ag 10/15/2020 Negative  Negative Final   • Rapid Influenza B Ag 10/15/2020 Negative  Negative Final   • Internal Control 10/15/2020 Passed  Passed Final   • Lot Number 10/15/2020 [id number]   Final   • Expiration Date 10/15/2020 2022-05-07   Final       Assessment/Plan   Diagnoses and all orders for this visit:    1. Medication management (Primary)  -     [facility] Drug Screen    2. Morbidly obese (CMS/[facility])    3. ADHD (attention deficit hyperactivity disorder), combined type  -     amphetamine-dextroamphetamine (Adderall) 10 MG tablet; Take 1 tablet by mouth Daily.  Dispense: 30 tablet; Refill: 0        Visit Diagnoses:    ICD-10-CM ICD-9-CM   1. Medication management  Z79.899 V58.69   2. Morbidly obese (CMS/[facility])  E66.01 278.01   3. ADHD (attention deficit hyperactivity disorder), combined type  F90.2 314.01     Review:   I have reviewed the patient's previous medical records to include labs, radiology, notes and medications.     Impression:   -This is  my initial evaluation of the patient.  Shilo is endorsing worsening symptoms of ADHD with poor focus, concentration, and attention.  Shilo and I had a lengthy discussion about the causes of ADHD and evidence-based treatment.  We discussed adding a medication such as a stimulant versus a medication like Wellbutrin.  Shilo states that he prefers to take a stimulant since that has worked in the past and he denies any depressive symptoms.  Shilo and I discussed the need to obtain CPT 3 testing in the future should he moved for his wife's medical school.  He verbalized understanding and states he may do this in January.  We also discussed the progression of his treatment beginning with Adderall IR, then going to Adderall XR, and then Vyvanse in the future.  -Initiate Adderall 10 mg once daily for ADHD symptoms.  The patient is being prescribed a controlled substance as part of the treatment plan. The patient/guardian has been educated of appropriate use of the medication(s), including risks and side effects such as insomnia, headache, exacerbation of tics, nervousness, irritability, overstimulation, tremor, dizziness, anorexia or change in appetite, nausea, diarrhea, weight loss, sexual dysfunction, psychotic episodes, seizures, palpitations, tachycardia, hypertension, rare activation (activation of hypomania, jacey, and/or suicidal ideations), cardiovascular adverse effects including sudden death (especially patients with pre-existing structural abnormalities often associated with a family history of cardiac disease), sedation is possible but activation is much more common, metabolic adversities, and overdose among others. Patient/guardian is also informed that the medication is to be used by the patient only, the medication is to be used only as directed, and the medication should not be combined with other substances unless directed by a Provider/Prescriber. The patient/guardian verbalized understanding and  agreement with this in their own words, and wish to continue with current treatment plan.  -The patient has read and signed the [facility] Controlled Substance Contract. We discussed the risks and benefits of the use of controlled substances, including the risk of tolerance and drug dependence. Anorectic medications can be prescribed by one provider at a time and dispensed from one facility at a time, they can only be taken as prescribed, and we are not obligated to refill them if lost or stolen. The refills are only during regular clinic hours. Meghan report was pulled on patient, reviewed and found to be appropriate.   -The MEGHAN report, reviewed through PDMP, of the past 12 months were reviewed and is appropriate.  The patient/guardian reports taking the medication only as prescribed.  The patient/guardian denies any abuse or misuse of the medication.  The patient/guardian denies any other substance use or issues.  There are no apparent substance related issues.  The patient reports no side effects of the current medication usage.  The patient/guardian has reported significant improvement with medication usage and wishes to continue medication as prescribed.  The patient/guardian is appropriate to continue with current medication usage at this time.  Reinforced risks and side effects of medication usage, patient and/or guardian verbalize understanding in their own words and are in agreement with current plan.  -I collected a urine drug screen in the office today.  Shilo was negative for all substances as expected.  I will review the official report.        TREATMENT PLAN/GOALS: Continue supportive psychotherapy efforts and medications as indicated. Treatment and medication options discussed during today's visit. Patient ackowledged and verbally consented to continue with current treatment plan and was educated on the importance of compliance with treatment and follow-up appointments.    MEDICATION  ISSUES:    We discussed risks, benefits, and side effects of the above medications and the patient was agreeable with the plan. Patient was educated on the importance of compliance with treatment and follow-up appointments.  Patient is agreeable to call the office with any worsening of symptoms or onset of side effects. Patient is agreeable to call 911 or go to the nearest ER should he/she begin having SI/HI.      Counseled patient regarding multimodal approach with healthy nutrition, healthy sleep, regular physical activity, social activities, counseling, and medications.      Coping skills reviewed and encouraged positive framing of thoughts     Assisted patient in processing above session content; acknowledged and normalized patient’s thoughts, feelings, and concerns.  Applied  positive coping skills and behavior management in session.  Allowed patient to freely discuss issues without interruption or judgment. Provided safe, confidential environment to facilitate the development of positive therapeutic relationship and encourage open, honest communication. Assisted patient in identifying risk factors which would indicate the need for higher level of care including thoughts to harm self or others and/or self-harming behavior and encouraged patient to contact this office, call 911, or present to the nearest emergency room should any of these events occur. Discussed crisis intervention services and means to access.     MEDS ORDERED DURING VISIT:  New Medications Ordered This Visit   Medications   • amphetamine-dextroamphetamine (Adderall) 10 MG tablet     Sig: Take 1 tablet by mouth Daily.     Dispense:  30 tablet     Refill:  0       Return in about 6 weeks (around 12/25/2020) for Medication Check.             This document has been electronically signed by GILBERT Grider  November 13, 2020 10:45 EST    Please note that portions of this note were completed with a voice recognition program. Efforts  were made to edit dictation, but occasionally words are mistranscribed.

## 2020-12-23 ENCOUNTER — OFFICE VISIT (OUTPATIENT)
Dept: PSYCHIATRY | Facility: CLINIC | Age: 25
End: 2020-12-23

## 2020-12-23 VITALS
BODY MASS INDEX: 34.72 KG/M2 | HEIGHT: 66 IN | HEART RATE: 91 BPM | DIASTOLIC BLOOD PRESSURE: 80 MMHG | TEMPERATURE: 98.4 F | SYSTOLIC BLOOD PRESSURE: 132 MMHG | WEIGHT: 216 LBS | RESPIRATION RATE: 18 BRPM

## 2020-12-23 DIAGNOSIS — R05.9 COUGH: ICD-10-CM

## 2020-12-23 DIAGNOSIS — F90.2 ADHD (ATTENTION DEFICIT HYPERACTIVITY DISORDER), COMBINED TYPE: Primary | ICD-10-CM

## 2020-12-23 PROCEDURE — 99212 OFFICE O/P EST SF 10 MIN: CPT | Performed by: NURSE PRACTITIONER

## 2020-12-23 RX ORDER — ALBUTEROL SULFATE 90 UG/1
2 AEROSOL, METERED RESPIRATORY (INHALATION) EVERY 6 HOURS PRN
OUTPATIENT
Start: 2020-12-23

## 2020-12-23 RX ORDER — DEXTROAMPHETAMINE SACCHARATE, AMPHETAMINE ASPARTATE MONOHYDRATE, DEXTROAMPHETAMINE SULFATE AND AMPHETAMINE SULFATE 3.75; 3.75; 3.75; 3.75 MG/1; MG/1; MG/1; MG/1
15 CAPSULE, EXTENDED RELEASE ORAL DAILY
Qty: 30 CAPSULE | Refills: 0 | Status: SHIPPED | OUTPATIENT
Start: 2020-12-23 | End: 2021-01-25 | Stop reason: SDUPTHER

## 2020-12-23 NOTE — PROGRESS NOTES
"Subjective   Shilo Kamara is a 25 y.o. male who presents today for follow up    Chief Complaint: ADHD, combined type    History of Present Illness: Shilo is a 25-year-old  male who presents by himself for a follow-up and medication check.  Shilo states \"I graduated.  I have started applying for jobs now.\"  Shilo tells me that he applied for a position with the [employer] and [facility].  He tells me he is hoping to get his foot in the door for a future in government work.  His major was [employer].  He tells me that his wife continues her schooling and is also working.  He tells me she plans to apply for medical school soon.  Shilo tells me that the addition of Adderall to his medication regiment really helped his focus, concentration, and attention; however, the medicine was not as effective towards the end of the month.  Shilo tells me he would wake up at 9 AM and take his medications after breakfast at about 10 AM.  He tells me the medication would last until around 5 PM.  We had a lengthy discussion about changing his medication from immediate release to extended release.  He verbalized understanding.  Shilo's current medication regimen includes Adderall 10 mg daily.  He denies any side effects of his medication regiment.  He denies any problems with sleep or appetite.  He denies any SI/HI/AVH.    The following portions of the patient's history were reviewed and updated as appropriate: allergies, current medications, past family history, past medical history, past social history, past surgical history and problem list.    Allergy:   No Known Allergies     Current Medications:   Current Outpatient Medications   Medication Sig Dispense Refill   • albuterol sulfate  (90 Base) MCG/ACT inhaler Inhale 2 puffs Every 6 (Six) Hours As Needed for Wheezing or Shortness of Air. 1 inhaler 0   • fluticasone (FLONASE) 50 MCG/ACT nasal spray 1-2 sprays each nostril daily 1 bottle 6   • Loratadine 10 " "MG capsule Take  by mouth.     • amphetamine-dextroamphetamine XR (Adderall XR) 15 MG 24 hr capsule Take 1 capsule by mouth Daily 30 capsule 0     No current facility-administered medications for this visit.        Review of Symptoms:    Review of Systems   Constitutional: Positive for appetite change (decreased). Negative for chills, fever, unexpected weight gain and unexpected weight loss.   HENT: Negative.    Eyes: Negative.    Respiratory: Negative for cough and shortness of breath.    Cardiovascular: Negative for chest pain and palpitations.   Gastrointestinal: Negative for abdominal pain, constipation, diarrhea, vomiting and indigestion.   Musculoskeletal: Negative for arthralgias, gait problem and joint swelling.   Skin: Negative.    Allergic/Immunologic: Negative.    Neurological: Negative for dizziness, speech difficulty, weakness, memory problem and confusion.   Psychiatric/Behavioral: Positive for decreased concentration (improved). Negative for behavioral problems, sleep disturbance, suicidal ideas and depressed mood. The patient is not nervous/anxious.        PHQ-9 Score:   PHQ-9 Total Score: 0     Physical Exam:   Blood pressure 132/80, pulse 91, temperature 98.4 °F (36.9 °C), temperature source Infrared, resp. rate 18, height 167.6 cm (66\"), weight 98 kg (216 lb). Body mass index is 34.86 kg/m².     Physical Exam  Vitals signs and nursing note reviewed.   Constitutional:       Appearance: He is well-developed.   Musculoskeletal: Normal range of motion.   Skin:     General: Skin is warm and dry.   Neurological:      Mental Status: He is alert and oriented to person, place, and time.   Psychiatric:         Attention and Perception: Attention normal.         Mood and Affect: Mood normal.         Speech: Speech normal.         Behavior: Behavior normal. Behavior is cooperative.         Thought Content: Thought content normal.         Cognition and Memory: Cognition normal.         Judgment: Judgment " normal.         Appearance: Well-developed, well nourished, appears stated age, and NAD  Gait, Station, Strength: WNL    Patient's Support Network Includes:  wife    Functional Status: Mild impairment     Progress toward goal: Not at goal    Prognosis: Fair with Ongoing Treatment     Mental Status Exam:   Hygiene:   good  Cooperation:  Cooperative  Eye Contact:  Good  Psychomotor Behavior:  Appropriate  Affect:  Appropriate  Mood: euthymic  Hopelessness: Denies  Speech:  Normal  Thought Process:  Goal directed and Linear  Thought Content:  Normal  Suicidal:  None  Homicidal:  None  Hallucinations:  None  Delusion:  None  Memory:  Intact  Orientation:  Person, Place, Time and Situation  Reliability:  good  Insight:  Good  Judgement:  Good  Impulse Control:  Good  Physical/Medical Issues:  No      Lab Results:   No visits with results within 1 Month(s) from this visit.   Latest known visit with results is:   Office Visit on 11/13/2020   Component Date Value Ref Range Status   • External Amphetamine Screen Urine 11/13/2020 Negative   Final   • Amphetamine Cut-Off 11/13/2020 1000 NG/ML   Final   • External Benzodiazepine Screen Uri* 11/13/2020 Negative   Final   • Benzodiazipine Cut-Off 11/13/2020 300 NG/ML   Final   • External Cocaine Screen Urine 11/13/2020 Negative   Final   • Cocaine Cut-Off 11/13/2020 30 NG/ML   Final   • External THC Screen Urine 11/13/2020 Negative   Final   • THC Cut-Off 11/13/2020 50 NG/ML   Final   • External Methadone Screen Urine 11/13/2020 Negative   Final   • Methadone Cut-Off 11/13/2020 300 NG/ML   Final   • External Methamphetamine Screen Ur* 11/13/2020 Negative   Final   • Methamphetamine Cut-Off 11/13/2020 1000 NG/ML   Final   • External Oxycodone Screen Urine 11/13/2020 Negative   Final   • Oxycodone Cut-Off 11/13/2020 100 NG/ML   Final   • External Buprenorphine Screen Urine 11/13/2020 Negative   Final   • Buprenorphine Cut-Off 11/13/2020 10 NG/ML   Final   • External MDMA 11/13/2020  Negative   Final   • MDMA Cut-Off 11/13/2020 500 NG/ML   Final   • External Opiates Screen Urine 11/13/2020 Negative   Final   • Opiates Cut-Off 11/13/2020 300 NG/ML   Final       Assessment/Plan   Diagnoses and all orders for this visit:    1. ADHD (attention deficit hyperactivity disorder), combined type (Primary)  -     amphetamine-dextroamphetamine XR (Adderall XR) 15 MG 24 hr capsule; Take 1 capsule by mouth Daily  Dispense: 30 capsule; Refill: 0        Visit Diagnoses:    ICD-10-CM ICD-9-CM   1. ADHD (attention deficit hyperactivity disorder), combined type  F90.2 314.01       Review:   I have reviewed the patient's previous medical records to include labs, radiology, notes and medications.     Impression:   -This is a follow up and medication check.  He was endorsing good symptom improvement of his ADHD.  He tells me the medication improved his focus, concentration, and attention but was not as effective as it was initially.  We had a discussion about making some adjustments to his regiment.  -Adderall 10 mg daily.  -Initiate Adderall XR 15 mg daily for ADHD symptoms.  I explained the purpose of changing from immediate release to extended release and increasing the dose.  Shilo verbalized understanding.  I continue to encourage him to monitor for side effects.  -The MEGHAN report, reviewed through PDMP, of the past 12 months were reviewed and is appropriate.  The patient/guardian reports taking the medication only as prescribed.  The patient/guardian denies any abuse or misuse of the medication.  The patient/guardian denies any other substance use or issues.  There are no apparent substance related issues.  The patient reports no side effects of the current medication usage.  The patient/guardian has reported significant improvement with medication usage and wishes to continue medication as prescribed.  The patient/guardian is appropriate to continue with current medication usage at this time.  Reinforced  risks and side effects of medication usage, patient and/or guardian verbalize understanding in their own words and are in agreement with current plan.      Time:   I spent a total of 12 minutes face to face, greater than 10 minutes (greater than 50%) were spent face-to-face with assessment, coordination care, and counseling the patient regarding depression/grief and answering any questions the patient had about the medication and plan.    TREATMENT PLAN/GOALS: Continue supportive psychotherapy efforts and medications as indicated. Treatment and medication options discussed during today's visit. Patient ackowledged and verbally consented to continue with current treatment plan and was educated on the importance of compliance with treatment and follow-up appointments.    MEDICATION ISSUES:    We discussed risks, benefits, and side effects of the above medications and the patient was agreeable with the plan. Patient was educated on the importance of compliance with treatment and follow-up appointments.  Patient is agreeable to call the office with any worsening of symptoms or onset of side effects. Patient is agreeable to call 911 or go to the nearest ER should he/she begin having SI/HI.      Counseled patient regarding multimodal approach with healthy nutrition, healthy sleep, regular physical activity, social activities, counseling, and medications.      Coping skills reviewed and encouraged positive framing of thoughts     Assisted patient in processing above session content; acknowledged and normalized patient’s thoughts, feelings, and concerns.  Applied  positive coping skills and behavior management in session.  Allowed patient to freely discuss issues without interruption or judgment. Provided safe, confidential environment to facilitate the development of positive therapeutic relationship and encourage open, honest communication. Assisted patient in identifying risk factors which would indicate the need for  higher level of care including thoughts to harm self or others and/or self-harming behavior and encouraged patient to contact this office, call 911, or present to the nearest emergency room should any of these events occur. Discussed crisis intervention services and means to access.     MEDS ORDERED DURING VISIT:  New Medications Ordered This Visit   Medications   • amphetamine-dextroamphetamine XR (Adderall XR) 15 MG 24 hr capsule     Sig: Take 1 capsule by mouth Daily     Dispense:  30 capsule     Refill:  0       Return in about 2 months (around 2/23/2021) for Medication Check.             This document has been electronically signed by GILBERT Sánchez, PMHNP-BC  December 23, 2020 09:04 EST    Part of this note may be an electronic transcription/translation of spoken language to printed text using the Dragon Dictation System.

## 2021-01-25 ENCOUNTER — TELEPHONE (OUTPATIENT)
Dept: PSYCHIATRY | Facility: CLINIC | Age: 26
End: 2021-01-25

## 2021-01-25 DIAGNOSIS — F90.2 ADHD (ATTENTION DEFICIT HYPERACTIVITY DISORDER), COMBINED TYPE: ICD-10-CM

## 2021-01-25 RX ORDER — DEXTROAMPHETAMINE SACCHARATE, AMPHETAMINE ASPARTATE MONOHYDRATE, DEXTROAMPHETAMINE SULFATE AND AMPHETAMINE SULFATE 3.75; 3.75; 3.75; 3.75 MG/1; MG/1; MG/1; MG/1
15 CAPSULE, EXTENDED RELEASE ORAL DAILY
Qty: 30 CAPSULE | Refills: 0 | Status: SHIPPED | OUTPATIENT
Start: 2021-01-25 | End: 2021-02-01

## 2021-01-25 NOTE — TELEPHONE ENCOUNTER
Pt states adderall xr 15 mg was working well at first wearing off quickly needs a refill and thought he would let you know how medication was working

## 2021-01-25 NOTE — TELEPHONE ENCOUNTER
Yes and then I will evaluate at his next appointment if he needs a breakthrough medication or change in the XR dose.

## 2021-02-01 ENCOUNTER — OFFICE VISIT (OUTPATIENT)
Dept: PSYCHIATRY | Facility: CLINIC | Age: 26
End: 2021-02-01

## 2021-02-01 VITALS
RESPIRATION RATE: 18 BRPM | SYSTOLIC BLOOD PRESSURE: 122 MMHG | WEIGHT: 217 LBS | HEIGHT: 66 IN | DIASTOLIC BLOOD PRESSURE: 74 MMHG | TEMPERATURE: 98.2 F | BODY MASS INDEX: 34.87 KG/M2 | HEART RATE: 105 BPM

## 2021-02-01 DIAGNOSIS — F90.2 ADHD (ATTENTION DEFICIT HYPERACTIVITY DISORDER), COMBINED TYPE: Primary | ICD-10-CM

## 2021-02-01 PROCEDURE — 99213 OFFICE O/P EST LOW 20 MIN: CPT | Performed by: NURSE PRACTITIONER

## 2021-02-01 NOTE — PROGRESS NOTES
"Chief Complaint  ADHD, combined type  Subjective          Shilo Kamara presents to [facility] by himself for a follow up and medication check.    History of Present Illness: Shilo states, \" I am doing good, just a little tired.\"  Shilo tells me that he is getting more symptom improvement with the extended release Adderall; however, he does feel fatigue throughout the day.  Shiol endorses more focus and concentration.  He tells me that the XR Adderall last longer than the immediate release.  Shilo endorses mild depressive symptoms such as depressed mood, anhedonia, difficulty with falling asleep, fatigue, and increased appetite.  He feels his symptoms are mostly related to not obtaining employment in his preferred field after graduation.  Shilo continues to work at [employer] and has applied for several jobs related to his major in [employer].  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  Shilo's wife is also applying for medical school.  Shilo tells me that they will be moving to [place] in June.  He also tells me that he plans to return to school as well for his masters degree.  Shilo's medication regimen includes Adderall XR.  He denies any side effects of his current medication regiment. He denies any SI/HI/AVH.    Current Medications:   Current Outpatient Medications   Medication Sig Dispense Refill   • albuterol sulfate  (90 Base) MCG/ACT inhaler Inhale 2 puffs Every 6 (Six) Hours As Needed for Wheezing or Shortness of Air. 1 inhaler 0   • fluticasone (FLONASE) 50 MCG/ACT nasal spray 1-2 sprays each nostril daily 1 bottle 6   • lisdexamfetamine (Vyvanse) 30 MG capsule Take 1 capsule by mouth Every Morning 30 capsule 0     No current facility-administered medications for this visit.          Objective   Vital Signs:   /74 (BP Location: Right arm, Patient Position: Sitting)   Pulse 105   " "Temp 98.2 °F (36.8 °C)   Resp 18   Ht 167.6 cm (66\")   Wt 98.4 kg (217 lb)   BMI 35.02 kg/m²     Physical Exam  Vitals signs and nursing note reviewed.   Constitutional:       Appearance: Normal appearance. He is well-developed.   Musculoskeletal: Normal range of motion.   Skin:     General: Skin is warm and dry.   Neurological:      Mental Status: He is alert and oriented to person, place, and time.   Psychiatric:         Attention and Perception: Attention normal.         Mood and Affect: Mood normal.         Speech: Speech normal.         Behavior: Behavior normal. Behavior is cooperative.         Thought Content: Thought content normal.         Cognition and Memory: Cognition normal.         Judgment: Judgment normal.        Result Review :                   Assessment and Plan    Problem List Items Addressed This Visit     None      Visit Diagnoses     ADHD (attention deficit hyperactivity disorder), combined type    -  Primary    Relevant Medications    lisdexamfetamine (Vyvanse) 30 MG capsule          Mental Status Exam:   Hygiene:   good  Cooperation:  Cooperative  Eye Contact:  Good  Psychomotor Behavior:  Appropriate  Affect:  Full range  Mood: normal  Speech:  Normal  Thought Process:  Goal directed and Linear  Thought Content:  Normal  Suicidal:  None  Homicidal:  None  Hallucinations:  None  Delusion:  None  Memory:  Intact  Orientation:  Person, Place, Time and Situation  Reliability:  good  Insight:  Good  Judgement:  Good  Impulse Control:  Good  Physical/Medical Issues:  No      PHQ-9 Score:   PHQ-9 Total Score: 9    Impression/Plan:  -This is a follow up and medication check.  Shilo is endorsing good symptom approval of his ADHD with his current stimulant therapy; however, he does feel fatigued throughout the day.  We discussed changing his stimulant to Vyvanse and Shilo is in agreement.  He will also be going to the [facility] for CPT 3 testing as he plans to move in June.  Shilo " feels his depressive symptoms are circumstantial and temporary.  He does not want to add any medication at this time.  -Patient screened positive for depression based on a PHQ-9 score of 9 on 2/1/2021. Follow-up recommendations include: Elevated PHQ score reflective of acute illness, not depression and Suicide Risk Assessment performed.  -Stop Adderall XR 15 mg due to fatigue.  -Initiate Vyvanse 30 mg daily for ADHD symptoms.  I explained the purpose of this medication to a few.  We discussed the risk versus benefits of adding this medication, as well as potential side effects.  He verbalized understanding.  -The MEGHAN report, reviewed through PDMP, of the past 12 months were reviewed and is appropriate.  The patient/guardian reports taking the medication only as prescribed.  The patient/guardian denies any abuse or misuse of the medication.  The patient/guardian denies any other substance use or issues.  There are no apparent substance related issues.  The patient reports no side effects of the current medication usage.  The patient/guardian has reported significant improvement with medication usage and wishes to continue medication as prescribed.  The patient/guardian is appropriate to continue with current medication usage at this time.  Reinforced risks and side effects of medication usage, patient and/or guardian verbalize understanding in their own words and are in agreement with current plan.        MEDS ORDERED DURING VISIT:  New Medications Ordered This Visit   Medications   • lisdexamfetamine (Vyvanse) 30 MG capsule     Sig: Take 1 capsule by mouth Every Morning     Dispense:  30 capsule     Refill:  0       I spent 17 minutes caring for Shilo on this date of service. This time includes time spent by me in the following activities:preparing for the visit, performing a medically appropriate examination and/or evaluation , counseling and educating the patient/family/caregiver, ordering medications, tests,  or procedures and documenting information in the medical record  Follow Up   Return in about 6 weeks (around 3/15/2021) for Medication Check.  Patient was given instructions and counseling regarding his condition or for health maintenance advice. Please see specific information pulled into the AVS if appropriate.       TREATMENT PLAN/GOALS: Continue supportive psychotherapy efforts and medications as indicated. Treatment and medication options discussed during today's visit. Patient acknowledged and verbally consented to continue with current treatment plan and was educated on the importance of compliance with treatment and follow-up appointments.    MEDICATION ISSUES:  Discussed medication options and treatment plan of prescribed medication as well as the risks, benefits, and side effects including potential falls, possible impaired driving and metabolic adversities among others. Patient is agreeable to call the office with any worsening of symptoms or onset of side effects. Patient is agreeable to call 911 or go to the nearest ER should he/she begin having SI/HI.        This document has been electronically signed by GILBERT Sánchez, PMHNP-BC  February 1, 2021 14:38 EST    Part of this note may be an electronic transcription/translation of spoken language to printed text using the Dragon Dictation System.

## 2021-03-03 DIAGNOSIS — F90.2 ADHD (ATTENTION DEFICIT HYPERACTIVITY DISORDER), COMBINED TYPE: ICD-10-CM

## 2021-03-17 ENCOUNTER — IMMUNIZATION (OUTPATIENT)
Dept: VACCINE CLINIC | Facility: HOSPITAL | Age: 26
End: 2021-03-17

## 2021-03-17 ENCOUNTER — OFFICE VISIT (OUTPATIENT)
Dept: PSYCHIATRY | Facility: CLINIC | Age: 26
End: 2021-03-17

## 2021-03-17 VITALS — WEIGHT: 220 LBS | HEIGHT: 66 IN | BODY MASS INDEX: 35.36 KG/M2

## 2021-03-17 DIAGNOSIS — F90.2 ADHD (ATTENTION DEFICIT HYPERACTIVITY DISORDER), COMBINED TYPE: Primary | ICD-10-CM

## 2021-03-17 PROCEDURE — 91300 HC SARSCOV02 VAC 30MCG/0.3ML IM: CPT | Performed by: INTERNAL MEDICINE

## 2021-03-17 PROCEDURE — 99212 OFFICE O/P EST SF 10 MIN: CPT | Performed by: NURSE PRACTITIONER

## 2021-03-17 PROCEDURE — 0001A: CPT | Performed by: INTERNAL MEDICINE

## 2021-03-17 NOTE — PROGRESS NOTES
"Chief Complaint  ADHD, combined type      Subjective          Shilo Kamara presents to [facility] by himself for a follow up and medication check.    History of Present Illness: Shilo states, \" I like the Vyvanse a lot.\"  Shilo tells me that the switch from Adderall to Vyvanse has been a good adjustment.  He tells me that he is sleeping better and has improved focus, concentration, and attention.  He also notices that there are not as many side effects of Vyvanse.  Shilo continues to prepare to move to [place], [place].  He is inquiring about finding a provider.  I encouraged him to utilize psychology today to find a medication management provider.  I also reminded Shilo to complete the CPT 3 testing for documentation.  I also reminded him to sign a release of information when he finds another provider.  Shilo's current medication regimen includes Vyvanse 30 mg daily.  He denies any side effects of his current medication regiment.  He denies any problems with sleep or appetite.  He denies any SI/HI/AVH.    Current Medications:   Current Outpatient Medications   Medication Sig Dispense Refill   • albuterol sulfate  (90 Base) MCG/ACT inhaler Inhale 2 puffs Every 6 (Six) Hours As Needed for Wheezing or Shortness of Air. 1 inhaler 0   • fluticasone (FLONASE) 50 MCG/ACT nasal spray 1-2 sprays each nostril daily 1 bottle 6   • lisdexamfetamine (Vyvanse) 30 MG capsule Take 1 capsule by mouth Every Morning 30 capsule 0     No current facility-administered medications for this visit.         Objective   Vital Signs:   Ht 167.6 cm (66\")   Wt 99.8 kg (220 lb)   BMI 35.51 kg/m²     Physical Exam  Vitals and nursing note reviewed.   Constitutional:       Appearance: Normal appearance. He is well-developed.   Musculoskeletal:         General: Normal range of motion.   Skin:     General: Skin is warm and dry.   Neurological:      Mental Status: He is alert and oriented " to person, place, and time.   Psychiatric:         Attention and Perception: Attention normal.         Mood and Affect: Mood normal.         Speech: Speech normal.         Behavior: Behavior normal. Behavior is cooperative.         Thought Content: Thought content normal.         Cognition and Memory: Cognition normal.         Judgment: Judgment normal.        Result Review :                   Assessment and Plan    Problem List Items Addressed This Visit     None      Visit Diagnoses     ADHD (attention deficit hyperactivity disorder), combined type    -  Primary          Mental Status Exam:   Hygiene:   good  Cooperation:  Cooperative  Eye Contact:  Good  Psychomotor Behavior:  Appropriate  Affect:  Full range  Mood: normal  Speech:  Normal  Thought Process:  Goal directed and Linear  Thought Content:  Normal  Suicidal:  None  Homicidal:  None  Hallucinations:  None  Delusion:  None  Memory:  Intact  Orientation:  Person, Place, Time and Situation  Reliability:  good  Insight:  Good  Judgement:  Good  Impulse Control:  Good  Physical/Medical Issues:  No      PHQ-9 Score:   PHQ-9 Total Score: 0    Impression/Plan:  -This is a follow up and medication check.  Shilo is endorsing good focus, concentration, and attention with his current medication regiment.  He denies any problems with sleep or appetite.  He continues to prepare to move and I provided some suggestions for preparing him with his medication management provider.  He is pleased with his current medication regimen and does not wish to make any changes at this time.  -Continue Vyvanse 30 mg daily for ADHD symptoms.  Patient has refills.  -The MEGHAN report, reviewed through PDMP, of the past 12 months were reviewed and is appropriate.  The patient/guardian reports taking the medication only as prescribed.  The patient/guardian denies any abuse or misuse of the medication.  The patient/guardian denies any other substance use or issues.  There are no  apparent substance related issues.  The patient reports no side effects of the current medication usage.  The patient/guardian has reported significant improvement with medication usage and wishes to continue medication as prescribed.  The patient/guardian is appropriate to continue with current medication usage at this time.  Reinforced risks and side effects of medication usage, patient and/or guardian verbalize understanding in their own words and are in agreement with current plan.    MEDS ORDERED DURING VISIT:  No orders of the defined types were placed in this encounter.      I spent 12 minutes caring for Shilo on this date of service. This time includes time spent by me in the following activities:preparing for the visit, performing a medically appropriate examination and/or evaluation , counseling and educating the patient/family/caregiver, ordering medications, tests, or procedures, documenting information in the medical record and care coordination.    Follow Up   Return in about 3 months (around 6/17/2021) for Medication Check.  Patient was given instructions and counseling regarding his condition or for health maintenance advice. Please see specific information pulled into the AVS if appropriate.       TREATMENT PLAN/GOALS: Continue supportive psychotherapy efforts and medications as indicated. Treatment and medication options discussed during today's visit. Patient acknowledged and verbally consented to continue with current treatment plan and was educated on the importance of compliance with treatment and follow-up appointments.    MEDICATION ISSUES:  Discussed medication options and treatment plan of prescribed medication as well as the risks, benefits, and side effects including potential falls, possible impaired driving and metabolic adversities among others. Patient is agreeable to call the office with any worsening of symptoms or onset of side effects. Patient is agreeable to call 911 or go to  the nearest ER should he/she begin having SI/HI.        This document has been electronically signed by GILBERT Sánchez, PMHNP-BC  March 17, 2021 08:34 EDT    Part of this note may be an electronic transcription/translation of spoken language to printed text using the Dragon Dictation System.

## 2021-03-18 ENCOUNTER — TELEMEDICINE (OUTPATIENT)
Dept: FAMILY MEDICINE CLINIC | Facility: TELEHEALTH | Age: 26
End: 2021-03-18

## 2021-03-18 DIAGNOSIS — T50.B95A ADVERSE EFFECT OF COVID-19 VACCINE: Primary | ICD-10-CM

## 2021-03-18 DIAGNOSIS — Z76.0 MEDICATION REFILL: ICD-10-CM

## 2021-03-18 PROCEDURE — 99213 OFFICE O/P EST LOW 20 MIN: CPT | Performed by: NURSE PRACTITIONER

## 2021-03-18 RX ORDER — ALBUTEROL SULFATE 90 UG/1
2 AEROSOL, METERED RESPIRATORY (INHALATION) EVERY 4 HOURS PRN
Qty: 18 G | Refills: 0 | Status: SHIPPED | OUTPATIENT
Start: 2021-03-18 | End: 2021-10-21

## 2021-03-18 RX ORDER — ONDANSETRON 4 MG/1
4-8 TABLET, ORALLY DISINTEGRATING ORAL EVERY 8 HOURS PRN
Qty: 20 TABLET | Refills: 0 | Status: SHIPPED | OUTPATIENT
Start: 2021-03-18 | End: 2022-04-20 | Stop reason: SDUPTHER

## 2021-03-18 RX ORDER — FLUTICASONE PROPIONATE 50 MCG
2 SPRAY, SUSPENSION (ML) NASAL DAILY PRN
Qty: 16 G | Refills: 0 | Status: SHIPPED | OUTPATIENT
Start: 2021-03-18

## 2021-03-18 NOTE — PATIENT INSTRUCTIONS
"COVID-19 Vaccine Information  The COVID-19 vaccines are vaccines that can help to prevent infection with the virus that causes COVID-19. These vaccines have been authorized for emergency use.  These vaccines may not be available to everyone right away. Until the vaccines are available for everyone, it is important to continue taking steps to protect yourself and others from the virus.  What does emergency use authorization mean?  \"Emergency use authorization\" means that the vaccines are still being tested, but that people can receive the vaccines sooner because the vaccines have been deemed safe and effective in preventing COVID-19. Emergency use authorization is not the same as approval. A final approval will be given later after more information is known about the vaccines.  The U.S. Food and Drug Administration (FDA) can give emergency use authorization for a vaccine that has not gone through the full approval process if:  · There is a public health emergency, such as the COVID-19 pandemic.  · There are no other approved alternatives that can prevent the disease.  · Enough tests have been done to show that the vaccine is effective against the virus and is safe for people.  Are the vaccines safe?  No serious safety concerns were found during research studies to test these vaccines.  Although the approval process was sped up so that people could get the vaccine sooner, the vaccines are still going through a rigorous approval process to make sure that they are safe.  There are monitoring programs in place to track side effects and any reactions that occur, such as the CDC's v-safe program: www.cdc.gov/vsafe  More studies are needed to find out if the vaccines are safe for women who are pregnant or breastfeeding. If you are pregnant or breastfeeding, you should talk to your health care provider about the benefits and risks of receiving the vaccine. You may choose to receive the vaccine if you are at high risk of " "being exposed to the virus or becoming very ill from the virus.  Do the vaccines work?  Yes. In studies, the COVID-19 vaccines have been shown to prevent COVID-19 disease. More studies need to be done in real-world conditions and with more people to understand how effective they truly are.  More studies are also needed to find out how well these vaccines work in people who have a weakened disease-fighting system (are immunocompromised).  Will there be enough vaccines for everyone?  No. At the start, there will be a limited supply of the vaccines. Experts are deciding who should get the vaccines first. Everyone else will get the vaccine when more supply becomes available.  The people who are likely to get the vaccines first are:  · Those who are at higher risk of getting COVID-19. These include health care workers, those serving in the , and people who are considered \"essential workers.\" Essential workers are people who do any work that is so important that it must continue during the pandemic. Examples include people who work in law enforcement, energy, childcare, transportation, and retail (such as grocery stores).  · Those who are at higher risk of getting serious illness and complications from COVID-19. These include older people and people with underlying health problems. Talk to your health care provider about your risk for serious complications from COVID-19.  Who should not get the vaccine?  If you are allergic to any ingredients in the vaccine, you should not receive the vaccine. Also, if you have had a severe allergic reaction to a vaccine or other shot, you should talk to your health care provider before receiving the vaccine.  How much will the vaccine cost?  The government is working to make sure that the vaccine is available for everyone regardless of cost.  Will I get COVID-19 from the vaccine?  No. You cannot get COVID-19 from getting the vaccine. It is possible to be infected with COVID-19 " before or just after you get the vaccine and then get sick before your body has had enough time to make immunity.  Also, as with any vaccine, there is a chance of having some side effects. In research studies of the COVID-19 vaccine, some people had a fever, felt very tired (fatigued), or had a headache. Getting these symptoms does not mean that you have COVID-19. This is a normal reaction to the vaccine when your body is making immunity against the virus.  How many shots will I need?  Most COVID-19 vaccines that are being tested require you to get more than one shot. The first shot starts the process of building immunity in your body. The second shot ensures that you get the most protection possible from the vaccine.  How do the vaccines work?  The vaccines that have been authorized for emergency use rely on mRNA (messenger RNA) to help your body build an immune response. When mRNA is put into your body as a vaccine, it gives instructions to your cells to create a protein like that found on the virus. Your body creates this protein, then destroys the instructions. Your body sees this protein as an invader and creates an immune response. This immune response builds memory so that if you are exposed to the virus in the future, your body can fight off the virus.  Your body needs time to build immunity after you get the vaccine. You can get COVID-19 if you are exposed to the virus that causes the disease before your body has had a chance to build immunity.  Do I still need to social distance and wear a mask if I get the vaccine?         Yes. The COVID-19 vaccines are very effective, but even if you get the vaccine, there is still a chance of getting COVID-19. Take the following actions:  · Stay away from people who are sick.  · Wash your hands often with soap and water for at least 20 seconds. If soap and water are not available, use alcohol-based hand .  · Avoid going out in public. Follow guidance from your  state and local health authorities.  · If you must go out in public, wear a cloth face covering or face mask. Make sure your mask covers your nose and mouth.  · Avoid crowded indoor spaces. Stay at least 6 ft (2 m) away from others.  Where to find more information  · Centers for Disease Control and Prevention: https://www.cdc.gov/coronavirus/2019-ncov/vaccines/index.html  · U.S. Food and Drug Administration: https://www.fda.gov/emergency-preparedness-and-response/mcm-legal-regulatory-and-policy-framework/emergency-use-authorization#zgjtp23bfhb  · World Health Organization: https://www.who.int/news-room/q-a-detail/coronavirus-disease-(covid-19)-vaccines  Summary  · The COVID-19 vaccines are vaccines that can help to prevent infection with the virus that causes COVID-19. These vaccines have been authorized for emergency use.  · In studies, the COVID-19 vaccines have been shown to prevent COVID-19 disease.  · You cannot get COVID-19 from getting the vaccine.  · Until the vaccines are available for everyone, it is important to continue taking steps to protect yourself and others from the virus.  This information is not intended to replace advice given to you by your health care provider. Make sure you discuss any questions you have with your health care provider.  Document Revised: 01/19/2021 Document Reviewed: 01/07/2021  [employer]ick Patient Education © 2021 [employer]

## 2021-03-18 NOTE — PROGRESS NOTES
HPI  Shilo Kamara is a 25 y.o. male  presents with complaint of side effects from Covid vaccine yesterday ([place]). Reports nausea, night sweats last night, sore injection site, and headache. Has not taken any meds for symptoms. Denies fever, SOA, CP, V/D. Temp last night was 98.3.    Also requests refills on flonase and albuterol inh.     Review of Systems   Constitutional: Negative for fever.   Respiratory: Negative for shortness of breath.    Cardiovascular: Negative for chest pain.   Gastrointestinal: Positive for nausea. Negative for diarrhea and vomiting.   Neurological: Negative for headaches.       Past Medical History:   Diagnosis Date   • ADHD (attention deficit hyperactivity disorder)    • Hx of balanitis     1 year ago   • Seasonal allergies        Family History   Problem Relation Age of Onset   • No Known Problems Mother    • No Known Problems Father    • Dementia Maternal Grandmother    • Drug abuse Cousin    • ADD / ADHD Neg Hx    • Alcohol abuse Neg Hx    • Anxiety disorder Neg Hx    • Bipolar disorder Neg Hx    • Depression Neg Hx    • OCD Neg Hx    • Paranoid behavior Neg Hx    • Schizophrenia Neg Hx    • Seizures Neg Hx    • Self-Injurious Behavior  Neg Hx    • Suicide Attempts Neg Hx        Social History     Socioeconomic History   • Marital status:      Spouse name: Not on file   • Number of children: Not on file   • Years of education: Not on file   • Highest education level: Not on file   Tobacco Use   • Smoking status: Never Smoker   • Smokeless tobacco: Never Used   Vaping Use   • Vaping Use: Never used   Substance and Sexual Activity   • Alcohol use: Yes     Comment: SOCIALLY   • Drug use: No   • Sexual activity: Defer         There were no vitals taken for this visit.    PHYSICAL EXAM  Physical Exam   Constitutional: He appears well-developed and well-nourished.   HENT:   Head: Normocephalic.   Nose: Nose normal.   Neck: Neck normal appearance.  Pulmonary/Chest:  Effort normal.   Neurological: He is alert.   Psychiatric: He has a normal mood and affect. His speech is normal.       Diagnoses and all orders for this visit:    1. Adverse effect of COVID-19 vaccine (Primary)  -     ondansetron ODT (Zofran ODT) 4 MG disintegrating tablet; Place 1-2 tablets on the tongue Every 8 (Eight) Hours As Needed for Nausea or Vomiting.  Dispense: 20 tablet; Refill: 0    2. Medication refill  -     albuterol sulfate  (90 Base) MCG/ACT inhaler; Inhale 2 puffs Every 4 (Four) Hours As Needed for Wheezing.  Dispense: 18 g; Refill: 0  -     fluticasone (Flonase) 50 MCG/ACT nasal spray; 2 sprays into the nostril(s) as directed by provider Daily As Needed for Allergies.  Dispense: 16 g; Refill: 0          FOLLOW-UP  As discussed during visit with [facility], if symptoms worsen or fail to improve, follow-up with PCP/Urgent Care/Emergency Department.    Patient verbalizes understanding of medications, instructions for treatment and follow-up.    Monique Nguyen, GILBERT  03/18/2021  10:13 EDT    This visit was performed via Telehealth.  This patient has been instructed to follow-up with their primary care provider if their symptoms worsen or the treatment provided does not resolve their illness.

## 2021-03-30 ENCOUNTER — TELEPHONE (OUTPATIENT)
Dept: PSYCHIATRY | Facility: CLINIC | Age: 26
End: 2021-03-30

## 2021-03-30 DIAGNOSIS — F90.2 ADHD (ATTENTION DEFICIT HYPERACTIVITY DISORDER), COMBINED TYPE: ICD-10-CM

## 2021-04-07 ENCOUNTER — IMMUNIZATION (OUTPATIENT)
Dept: VACCINE CLINIC | Facility: HOSPITAL | Age: 26
End: 2021-04-07

## 2021-04-07 PROCEDURE — 91300 HC SARSCOV02 VAC 30MCG/0.3ML IM: CPT | Performed by: INTERNAL MEDICINE

## 2021-04-07 PROCEDURE — 0002A: CPT | Performed by: INTERNAL MEDICINE

## 2021-04-28 ENCOUNTER — OFFICE VISIT (OUTPATIENT)
Dept: PSYCHIATRY | Facility: CLINIC | Age: 26
End: 2021-04-28

## 2021-04-28 VITALS
RESPIRATION RATE: 18 BRPM | DIASTOLIC BLOOD PRESSURE: 84 MMHG | SYSTOLIC BLOOD PRESSURE: 130 MMHG | HEART RATE: 89 BPM | BODY MASS INDEX: 33.27 KG/M2 | WEIGHT: 207 LBS | TEMPERATURE: 97.8 F | HEIGHT: 66 IN

## 2021-04-28 DIAGNOSIS — F90.2 ADHD (ATTENTION DEFICIT HYPERACTIVITY DISORDER), COMBINED TYPE: Primary | ICD-10-CM

## 2021-04-28 PROCEDURE — 99212 OFFICE O/P EST SF 10 MIN: CPT | Performed by: NURSE PRACTITIONER

## 2021-04-28 NOTE — PROGRESS NOTES
"Chief Complaint  ADHD, combined type      Subjective          Shilo Kamara presents to [facility] by himself for a follow up and medication check.    History of Present Illness: Shilo states, \"we have decided to stay in [place].\"  Sh[place] a [place] prior to her starting medical school; however, they decided the move was unnecessary for only 1 year.  Shilo states, \"I am fine about it.  I am going to look for a new car now.\"  Shilo feels his Vyvanse is controlling his focus, concentration, and attention but does tell me that some days are better than others.  He feels that there is a correlation with his activity.  Shilo states, \"it seems the medicine works better when I am not doing very much.\"  Shilo and I discussed the effects of tolerance and the possibility of increasing his Vyvanse to 40 mg.  He is in agreement to this.  Shilo's current medication regimen includes Vyvanse 30 mg daily.  He denies any side effects of his current medication regiment.  He denies any problems with sleep or appetite.  He denies any SI/HI/AVH.    Current Medications:   Current Outpatient Medications   Medication Sig Dispense Refill   • albuterol sulfate  (90 Base) MCG/ACT inhaler Inhale 2 puffs Every 4 (Four) Hours As Needed for Wheezing. 18 g 0   • fluticasone (Flonase) 50 MCG/ACT nasal spray 2 sprays into the nostril(s) as directed by provider Daily As Needed for Allergies. 16 g 0   • lisdexamfetamine (Vyvanse) 40 MG capsule Take 1 capsule by mouth Every Morning 30 capsule 0   • ondansetron ODT (Zofran ODT) 4 MG disintegrating tablet Place 1-2 tablets on the tongue Every 8 (Eight) Hours As Needed for Nausea or Vomiting. 20 tablet 0     No current facility-administered medications for this visit.         Objective   Vital Signs:   /84 (BP Location: Left arm)   Pulse 89   Temp 97.8 °F (36.6 °C) (Infrared)   Resp 18  " " Ht 167.6 cm (66\")   Wt 93.9 kg (207 lb)   BMI 33.41 kg/m²     Physical Exam  Vitals and nursing note reviewed.   Constitutional:       Appearance: Normal appearance. He is well-developed.   Musculoskeletal:         General: Normal range of motion.   Skin:     General: Skin is warm and dry.   Neurological:      Mental Status: He is alert and oriented to person, place, and time.   Psychiatric:         Attention and Perception: Attention normal.         Mood and Affect: Mood normal.         Speech: Speech normal.         Behavior: Behavior normal. Behavior is cooperative.         Thought Content: Thought content normal.         Cognition and Memory: Cognition normal.         Judgment: Judgment normal.        Result Review :     The following data was reviewed by: GILBERT Grider on 04/28/2021:    Telemedicine with Monique Nguyen APRN (03/18/2021)           Assessment and Plan    Problem List Items Addressed This Visit     None      Visit Diagnoses     ADHD (attention deficit hyperactivity disorder), combined type    -  Primary    Relevant Medications    lisdexamfetamine (Vyvanse) 40 MG capsule          Mental Status Exam:   Hygiene:   good  Cooperation:  Cooperative  Eye Contact:  Good  Psychomotor Behavior:  Appropriate  Affect:  Full range  Mood: normal  Speech:  Normal  Thought Process:  Goal directed and Linear  Thought Content:  Normal  Suicidal:  None  Homicidal:  None  Hallucinations:  None  Delusion:  None  Memory:  Intact  Orientation:  Person, Place, Time and Situation  Reliability:  good  Insight:  Good  Judgement:  Good  Impulse Control:  Good  Physical/Medical Issues:  No      PHQ-9 Score:   PHQ-9 Total Score: 0    Impression/Plan:  -This is a follow up and medication check.    Shilo does get good focus, concentration, and attention with his stimulant; however, he is noticing a tolerance.  Shilo and I discussed increasing his medication and he is in agreement to this.  " Shilo and his wife have decided to stay in [place].  He is currently working.  He denies any problems with sleep or appetite.  -Increase Vyvanse to 40 mg daily for ADHD symptoms.    -The MEGHAN report, reviewed through PDMP, of the past 12 months were reviewed and is appropriate.  The patient/guardian reports taking the medication only as prescribed.  The patient/guardian denies any abuse or misuse of the medication.  The patient/guardian denies any other substance use or issues.  There are no apparent substance related issues.  The patient reports no side effects of the current medication usage.  The patient/guardian has reported significant improvement with medication usage and wishes to continue medication as prescribed.  The patient/guardian is appropriate to continue with current medication usage at this time.  Reinforced risks and side effects of medication usage, patient and/or guardian verbalize understanding in their own words and are in agreement with current plan.    MEDS ORDERED DURING VISIT:  New Medications Ordered This Visit   Medications   • lisdexamfetamine (Vyvanse) 40 MG capsule     Sig: Take 1 capsule by mouth Every Morning     Dispense:  30 capsule     Refill:  0       I spent 11 minutes caring for Shilo on this date of service. This time includes time spent by me in the following activities:preparing for the visit, performing a medically appropriate examination and/or evaluation , counseling and educating the patient/family/caregiver, ordering medications, tests, or procedures and documenting information in the medical record  Follow Up   Return in about 3 months (around 7/28/2021) for Medication Check.  Patient was given instructions and counseling regarding his condition or for health maintenance advice. Please see specific information pulled into the AVS if appropriate.       TREATMENT PLAN/GOALS: Continue supportive psychotherapy efforts and medications as indicated. Treatment and  medication options discussed during today's visit. Patient acknowledged and verbally consented to continue with current treatment plan and was educated on the importance of compliance with treatment and follow-up appointments.    MEDICATION ISSUES:  Discussed medication options and treatment plan of prescribed medication as well as the risks, benefits, and side effects including potential falls, possible impaired driving and metabolic adversities among others. Patient is agreeable to call the office with any worsening of symptoms or onset of side effects. Patient is agreeable to call 911 or go to the nearest ER should he/she begin having SI/HI.        This document has been electronically signed by GILBERT Sánchez, PMHNP-BC  April 28, 2021 08:53 EDT    Part of this note may be an electronic transcription/translation of spoken language to printed text using the Dragon Dictation System.

## 2021-06-03 DIAGNOSIS — F90.2 ADHD (ATTENTION DEFICIT HYPERACTIVITY DISORDER), COMBINED TYPE: ICD-10-CM

## 2021-07-08 DIAGNOSIS — F90.2 ADHD (ATTENTION DEFICIT HYPERACTIVITY DISORDER), COMBINED TYPE: ICD-10-CM

## 2021-07-08 NOTE — TELEPHONE ENCOUNTER
RX REQUEST. [facility]. PT REQUEST MED ADJUSTMENT. ADVISED PT PROVIDER OUT OF OFFICE AND TO CALL BACK Monday AND CAN SEND A MESSAGE TO [facility]. THANKS

## 2021-07-08 NOTE — TELEPHONE ENCOUNTER
If he is looking for an adjustment to his medication, he will need to wait on Jen to return on Monday. Thanks.

## 2021-07-08 NOTE — TELEPHONE ENCOUNTER
Called patient and said he will discuss med adjustment with Jaymie at his appointment on 07/22. Pt is okay with taking Vyvanse 40 mg at this time if you could send in to his pharmacy. Thanks

## 2021-07-22 ENCOUNTER — OFFICE VISIT (OUTPATIENT)
Dept: PSYCHIATRY | Facility: CLINIC | Age: 26
End: 2021-07-22

## 2021-07-22 VITALS
BODY MASS INDEX: 34.39 KG/M2 | SYSTOLIC BLOOD PRESSURE: 142 MMHG | WEIGHT: 214 LBS | DIASTOLIC BLOOD PRESSURE: 90 MMHG | HEIGHT: 66 IN | HEART RATE: 92 BPM

## 2021-07-22 DIAGNOSIS — F90.2 ADHD (ATTENTION DEFICIT HYPERACTIVITY DISORDER), COMBINED TYPE: Primary | ICD-10-CM

## 2021-07-22 PROCEDURE — 99212 OFFICE O/P EST SF 10 MIN: CPT | Performed by: NURSE PRACTITIONER

## 2021-07-22 NOTE — PROGRESS NOTES
"Chief Complaint  ADHD, combined type       Subjective          Shilo Kamara presents to [facility] by himself for a follow up and medication check.    History of Present Illness: Shilo states, \"I am doing pretty good.\"  Shilo tells me that he got a job promotion at [employer].  He is currently working full-time.  His wife is also working full-time.  He tells me that she has decided to put off her MCAT test.  They are leaving for [place] today for vacation.  Shilo tells me that \"some days the Vyvanse works and some days it does not.\"  He is currently taking 40 mg of Vyvanse daily.  He denies any problems with sleep or appetite.  He denies any side effects of his current medication regiment.  He denies any SI/HI/AVH.    Current Medications:   Current Outpatient Medications   Medication Sig Dispense Refill   • albuterol sulfate  (90 Base) MCG/ACT inhaler Inhale 2 puffs Every 4 (Four) Hours As Needed for Wheezing. 18 g 0   • fluticasone (Flonase) 50 MCG/ACT nasal spray 2 sprays into the nostril(s) as directed by provider Daily As Needed for Allergies. 16 g 0   • lisdexamfetamine (Vyvanse) 40 MG capsule Take 1 capsule by mouth Every Morning 30 capsule 0   • ondansetron ODT (Zofran ODT) 4 MG disintegrating tablet Place 1-2 tablets on the tongue Every 8 (Eight) Hours As Needed for Nausea or Vomiting. 20 tablet 0     No current facility-administered medications for this visit.         Objective   Vital Signs:   /90   Pulse 92   Ht 167.6 cm (66\")   Wt 97.1 kg (214 lb)   BMI 34.54 kg/m²     Physical Exam  Vitals and nursing note reviewed.   Constitutional:       Appearance: Normal appearance. He is well-developed.   Musculoskeletal:         General: Normal range of motion.   Skin:     General: Skin is warm and dry.   Neurological:      Mental Status: He is alert and oriented to person, place, and time.   Psychiatric:         Attention and Perception: " Attention normal.         Mood and Affect: Mood normal.         Speech: Speech normal.         Behavior: Behavior normal. Behavior is cooperative.         Thought Content: Thought content normal.         Cognition and Memory: Cognition normal.         Judgment: Judgment normal.        Result Review :                   Assessment and Plan    Problem List Items Addressed This Visit     None      Visit Diagnoses     ADHD (attention deficit hyperactivity disorder), combined type    -  Primary          Mental Status Exam:   Hygiene:   good  Cooperation:  Cooperative  Eye Contact:  Good  Psychomotor Behavior:  Appropriate  Affect:  Appropriate  Mood: normal  Speech:  Normal  Thought Process:  Goal directed and Linear  Thought Content:  Normal  Suicidal:  None  Homicidal:  None  Hallucinations:  None  Delusion:  None  Memory:  Intact  Orientation:  Person, Place, Time and Situation  Reliability:  good  Insight:  Good  Judgement:  Good  Impulse Control:  Good  Physical/Medical Issues:  No      PHQ-9 Score:   PHQ-9 Total Score: 1    Impression/Plan:  -This is a follow up and medication check.  Shilo reports building a tolerance to his Vyvanse.  He states that some days it works and some days it does not.  He recently received a promotion at [employer] and would like to increase his medications for further benefits on ADHD symptoms.  He is working full-time.  His wife is working full-time.     -Continue Vyvanse 40 mg daily for ADHD symptoms.  Patient has refills.  At next refill, we will increase Vyvanse to 50 mg daily for ADHD symptoms.  -The MEGHAN report, reviewed through PDMP, of the past 12 months were reviewed and is appropriate.  The patient/guardian reports taking the medication only as prescribed.  The patient/guardian denies any abuse or misuse of the medication.  The patient/guardian denies any other substance use or issues.  There are no apparent substance related issues.  The patient reports no side effects of the  current medication usage.  The patient/guardian has reported significant improvement with medication usage and wishes to continue medication as prescribed.  The patient/guardian is appropriate to continue with current medication usage at this time.  Reinforced risks and side effects of medication usage, patient and/or guardian verbalize understanding in their own words and are in agreement with current plan.    MEDS ORDERED DURING VISIT:  No orders of the defined types were placed in this encounter.      I spent 11 minutes caring for Shilo on this date of service. This time includes time spent by me in the following activities:preparing for the visit, performing a medically appropriate examination and/or evaluation , counseling and educating the patient/family/caregiver, ordering medications, tests, or procedures and documenting information in the medical record  Follow Up   Return in about 3 months (around 10/22/2021) for Medication Check.  Patient was given instructions and counseling regarding his condition or for health maintenance advice. Please see specific information pulled into the AVS if appropriate.       TREATMENT PLAN/GOALS: Continue supportive psychotherapy efforts and medications as indicated. Treatment and medication options discussed during today's visit. Patient acknowledged and verbally consented to continue with current treatment plan and was educated on the importance of compliance with treatment and follow-up appointments.    MEDICATION ISSUES:  Discussed medication options and treatment plan of prescribed medication as well as the risks, benefits, and side effects including potential falls, possible impaired driving and metabolic adversities among others. Patient is agreeable to call the office with any worsening of symptoms or onset of side effects. Patient is agreeable to call 911 or go to the nearest ER should he/she begin having SI/HI.        This document has been electronically signed  by GILBERT Sánchez, PMHNP-BC  July 22, 2021 08:39 EDT    Part of this note may be an electronic transcription/translation of spoken language to printed text using the Dragon Dictation System.

## 2021-08-06 ENCOUNTER — TELEMEDICINE (OUTPATIENT)
Dept: FAMILY MEDICINE CLINIC | Facility: TELEHEALTH | Age: 26
End: 2021-08-06

## 2021-08-06 DIAGNOSIS — L08.9 SKIN INFECTION: Primary | ICD-10-CM

## 2021-08-06 PROCEDURE — 99212 OFFICE O/P EST SF 10 MIN: CPT | Performed by: NURSE PRACTITIONER

## 2021-08-07 NOTE — PROGRESS NOTES
"CHIEF COMPLAINT  Chief Complaint   Patient presents with   • Rash         HPI  Shilo Kamara is a 25 y.o. male  presents with complaint of \"rash\". Patient states he has a spot on his back that he noticed 2 days ago and he is \"concerned that it is staph\". He states he has no history of MRSA. He denies pain or swelling at the location of the spot, no drainage. He states he has been picking at it. Denies fever.     Review of Systems   Constitutional: Negative for activity change, appetite change, chills, fatigue and fever.   HENT: Negative.    Respiratory: Negative for cough, shortness of breath and wheezing.    Cardiovascular: Negative for chest pain.   Gastrointestinal: Negative for abdominal pain, diarrhea, nausea and vomiting.   Musculoskeletal: Negative for arthralgias, back pain, joint swelling and myalgias.   Skin: Positive for rash.   Neurological: Negative for headaches.   All other systems reviewed and are negative.      Past Medical History:   Diagnosis Date   • ADHD (attention deficit hyperactivity disorder)    • Hx of balanitis     1 year ago   • Seasonal allergies        Family History   Problem Relation Age of Onset   • No Known Problems Mother    • No Known Problems Father    • Dementia Maternal Grandmother    • Drug abuse Cousin    • ADD / ADHD Neg Hx    • Alcohol abuse Neg Hx    • Anxiety disorder Neg Hx    • Bipolar disorder Neg Hx    • Depression Neg Hx    • OCD Neg Hx    • Paranoid behavior Neg Hx    • Schizophrenia Neg Hx    • Seizures Neg Hx    • Self-Injurious Behavior  Neg Hx    • Suicide Attempts Neg Hx        Social History     Socioeconomic History   • Marital status:      Spouse name: Not on file   • Number of children: Not on file   • Years of education: Not on file   • Highest education level: Not on file   Tobacco Use   • Smoking status: Never Smoker   • Smokeless tobacco: Never Used   Vaping Use   • Vaping Use: Never used   Substance and Sexual Activity   • Alcohol " use: Not Currently   • Drug use: No   • Sexual activity: Defer         There were no vitals taken for this visit.    PHYSICAL EXAM  Physical Exam   Constitutional: He is oriented to person, place, and time. He appears well-developed and well-nourished. He does not have a sickly appearance. He does not appear ill. No distress. He appears obese.  HENT:   Head: Normocephalic and atraumatic.   Right Ear: External ear normal.   Left Ear: External ear normal.   Eyes: Conjunctivae and EOM are normal.   Pulmonary/Chest: Effort normal. No stridor.  No respiratory distress.  Neurological: He is alert and oriented to person, place, and time.   Skin: Skin is dry. Lesion noted.        Psychiatric: He has a normal mood and affect.           Diagnoses and all orders for this visit:    1. Skin infection (Primary)  -     mupirocin (Bactroban) 2 % ointment; Apply  topically to the appropriate area as directed 2 (Two) Times a Day for 7 days.  Dispense: 15 g; Refill: 0    Plan of Care:  Discussed with patient that the area of concern looks like a pimple that has been popped- he states he had been picking at the area. Discussed that as he has no history of MRSA, will use Bactroban ointment, keep area clean and dry, wash area with antibacterial soap, and avoid touching/squeezing/picking at the area. Wash hands before and after touching the lesion. Instructed to follow up for no improvement in 1 week, or sooner for new or worsening symptoms (increased redness or drainage, swelling, fever, etc.). Patient verbalized understanding.       FOLLOW-UP  As discussed during visit with PCP/[facility] if no improvement or Urgent Care/Emergency Department if worsening of symptoms    Patient verbalizes understanding of medication dosage, comfort measures, instructions for treatment and follow-up.    GILBERT Gleason  08/06/2021  22:09 EDT    This visit was performed via Telehealth.  This patient has been instructed to follow-up with their  primary care provider if their symptoms worsen or the treatment provided does not resolve their illness.    Time spent on this patient 19 min.

## 2021-08-07 NOTE — PATIENT INSTRUCTIONS
Skin Abscess    A skin abscess is an infected area of your skin that contains pus and other material. An abscess can happen in any part of your body. Some abscesses break open (rupture) on their own. Most continue to get worse unless they are treated. The infection can spread deeper into the body and into your blood, which can make you feel sick.  A skin abscess is caused by germs that enter the skin through a cut or scrape. It can also be caused by blocked oil and sweat glands or infected hair follicles.  This condition is usually treated by:  · Draining the pus.  · Taking antibiotic medicines.  · Placing a warm, wet washcloth over the abscess.  Follow these instructions at home:  Medicines    · Take over-the-counter and prescription medicines only as told by your doctor.  · If you were prescribed an antibiotic medicine, take it as told by your doctor. Do not stop taking the antibiotic even if you start to feel better.  Abscess care    · If you have an abscess that has not drained, place a warm, clean, wet washcloth over the abscess several times a day. Do this as told by your doctor.  · Follow instructions from your doctor about how to take care of your abscess. Make sure you:  ? Cover the abscess with a bandage (dressing).  ? Change your bandage or gauze as told by your doctor.  ? Wash your hands with soap and water before you change the bandage or gauze. If you cannot use soap and water, use hand .  · Check your abscess every day for signs that the infection is getting worse. Check for:  ? More redness, swelling, or pain.  ? More fluid or blood.  ? Warmth.  ? More pus or a bad smell.  General instructions  · To avoid spreading the infection:  ? Do not share personal care items, towels, or hot tubs with others.  ? Avoid making skin-to-skin contact with other people.  · Keep all follow-up visits as told by your doctor. This is important.  Contact a doctor if:  · You have more redness, swelling, or pain  around your abscess.  · You have more fluid or blood coming from your abscess.  · Your abscess feels warm when you touch it.  · You have more pus or a bad smell coming from your abscess.  · You have a fever.  · Your muscles ache.  · You have chills.  · You feel sick.  Get help right away if:  · You have very bad (severe) pain.  · You see red streaks on your skin spreading away from the abscess.  Summary  · A skin abscess is an infected area of your skin that contains pus and other material.  · The abscess is caused by germs that enter the skin through a cut or scrape. It can also be caused by blocked oil and sweat glands or infected hair follicles.  · Follow your doctor's instructions on caring for your abscess, taking medicines, preventing infections, and keeping follow-up visits.  This information is not intended to replace advice given to you by your health care provider. Make sure you discuss any questions you have with your health care provider.  Document Revised: 07/23/2020 Document Reviewed: 01/31/2019  [employer] Patient Education © 2021 Else[employer] Inc.  Mupirocin skin cream or ointment  What is this medicine?  MUPIROCIN (myoo PEER oh sin) is an antibiotic. It is used on the skin to treat skin infections.  This medicine may be used for other purposes; ask your health care provider or pharmacist if you have questions.  COMMON BRAND NAME(S): Bactroban, Centany, Centany AT  What should I tell my health care provider before I take this medicine?  They need to know if you have any of these conditions:  · an unusual or allergic reaction to mupirocin, polyethylene glycol (PEG), or other topical antibiotic medicine  · pregnant or trying to get pregnant  · breast-feeding  How should I use this medicine?  This medicine is for external use only. Follow the directions on the prescription label. Wash your hands before and after use. Before applying, wash the affected area with mild soap and water and pat dry. Apply a small  amount to the affected area and rub gently. You can cover the area with a gauze dressing. Do not get this medicine in your eyes. If you do, rinse out with plenty of cool tap water. Do not use your medicine more often than directed. Finish the full course of medicine prescribed by your doctor or health care professional even if you think your condition is better. Do not use over large areas of burnt skin.  Talk to your pediatrician regarding the use of this medicine in children. Special care may be needed.  Overdosage: If you think you have taken too much of this medicine contact a poison control center or emergency room at once.  NOTE: This medicine is only for you. Do not share this medicine with others.  What if I miss a dose?  If you miss a dose, take it as soon as you can. If it is almost time for your next dose, take only that dose. Do not take double or extra doses.  What may interact with this medicine?  Interactions are not expected. Do not use any other skin products on the affected area without telling your doctor or health care professional.  This list may not describe all possible interactions. Give your health care provider a list of all the medicines, herbs, non-prescription drugs, or dietary supplements you use. Also tell them if you smoke, drink alcohol, or use illegal drugs. Some items may interact with your medicine.  What should I watch for while using this medicine?  Tell your doctor or health care professional if your skin condition does not begin to improve within 3 to 5 days.  What side effects may I notice from receiving this medicine?  Side effects that you should report to your doctor or health care professional as soon as possible:  · skin rash, redness, continued swelling, burning, itching, stinging, or pain  Side effects that usually do not require medical attention (report to your doctor or health care professional if they continue or are bothersome):  · dry skin, itching  This list may  not describe all possible side effects. Call your doctor for medical advice about side effects. You may report side effects to FDA at 2-844-SYF-2115.  Where should I keep my medicine?  Keep out of the reach of children.  Store at room temperature between 20 and 25 degrees C (68 and 77 degrees F). Throw away any unused medicine after the expiration date.  NOTE: This sheet is a summary. It may not cover all possible information. If you have questions about this medicine, talk to your doctor, pharmacist, or health care provider.  © 2021 Elsevier/[employer] (2009-07-06 14:38:18)

## 2021-08-09 DIAGNOSIS — F90.2 ADHD (ATTENTION DEFICIT HYPERACTIVITY DISORDER), COMBINED TYPE: ICD-10-CM

## 2021-08-09 NOTE — TELEPHONE ENCOUNTER
RX REQUEST, PT SAID THAT YOU HAD TALKED ABOUT INCREASING MED. LOOKS LIKE AT LAST APPOINTMENT YOU ALL SPOKE ABOUT VYVANSE 50 MG ON 07/22/2021

## 2021-09-09 DIAGNOSIS — F90.2 ADHD (ATTENTION DEFICIT HYPERACTIVITY DISORDER), COMBINED TYPE: ICD-10-CM

## 2021-09-09 NOTE — TELEPHONE ENCOUNTER
Incoming Refill Request      Medication requested (name and dose): VYVANSE 50 MG    Pharmacy where request should be sent: [facility]    Additional details provided by patient: N/A  Best call back number: [phone]    Does the patient have less than a 3 day supply:    Rx Refill Note  Requested Prescriptions     Pending Prescriptions Disp Refills   • lisdexamfetamine (VYVANSE) 50 MG capsule 30 capsule 0     Sig: Take 1 capsule by mouth Every Morning      Last office visit with prescribing clinician: 7/22/2021      Next office visit with prescribing clinician: 10/21/2021            Neisha Lehman CMA  09/09/21, 13:25 EDT[x] Yes  [] No    Neisha Lehman CMA  09/09/21, 13:25 EDT

## 2021-10-11 DIAGNOSIS — F90.2 ADHD (ATTENTION DEFICIT HYPERACTIVITY DISORDER), COMBINED TYPE: ICD-10-CM

## 2021-10-11 NOTE — TELEPHONE ENCOUNTER
Chart review completed and medication refill approved.   Patient's MEGHAN report reviewed and deemed appropriate.  The patient/guardian endorses medication is taken as prescribed, and denies any abuse or misuse of the medication.  The patient/guardian denies any other substance use or issues.  There are no apparent substance related issues.  The patient reports no adverse effects of the current medication usage and is appropriate to continue with current medication usage at this time.  Scheduled medications can be prescribed by one provider at a time and dispensed from one facility at a time.  They can only be taken as prescribed, and we are not obligated to refill them if lost or stolen.  Reinforced risks and side effects of medication usage, patient and/or guardian verbalize understanding in their own words and are in agreement with current plan.

## 2021-10-21 ENCOUNTER — OFFICE VISIT (OUTPATIENT)
Dept: PSYCHIATRY | Facility: CLINIC | Age: 26
End: 2021-10-21

## 2021-10-21 VITALS
HEIGHT: 66 IN | HEART RATE: 105 BPM | DIASTOLIC BLOOD PRESSURE: 80 MMHG | SYSTOLIC BLOOD PRESSURE: 142 MMHG | BODY MASS INDEX: 33.11 KG/M2 | WEIGHT: 206 LBS

## 2021-10-21 DIAGNOSIS — F90.2 ADHD (ATTENTION DEFICIT HYPERACTIVITY DISORDER), COMBINED TYPE: Primary | Chronic | ICD-10-CM

## 2021-10-21 PROCEDURE — 99213 OFFICE O/P EST LOW 20 MIN: CPT | Performed by: NURSE PRACTITIONER

## 2021-10-21 NOTE — PROGRESS NOTES
"Chief Complaint  ADHD, combined type          Subjective          Shilo Kamara presents to [facility] by himself for a follow up and medication check.    History of Present Illness: Shlio states, \"  have been pretty good.\"  Shilo tells me he started his masters program at [employer] and is attending classes online.  He has plans to major in safety/security and emergency management.  He continues to work full-time.  His wife is working full-time as a scribe ambassador and is also taking classes.  She has plans to attend medical school in the future.  Shilo is trying to rest whenever possible as he is busy most days.  He is currently taking Vyvanse 50 mg daily for ADHD symptoms.  He reports having good focus, concentration, and attention with his stimulant treatment.  He denies any problems with sleep or appetite.  He denies any SI/HI/AVH.    Current Medications:   Current Outpatient Medications   Medication Sig Dispense Refill   • fluticasone (Flonase) 50 MCG/ACT nasal spray 2 sprays into the nostril(s) as directed by provider Daily As Needed for Allergies. 16 g 0   • lisdexamfetamine (VYVANSE) 50 MG capsule Take 1 capsule by mouth Every Morning 30 capsule 0   • ondansetron ODT (Zofran ODT) 4 MG disintegrating tablet Place 1-2 tablets on the tongue Every 8 (Eight) Hours As Needed for Nausea or Vomiting. 20 tablet 0     No current facility-administered medications for this visit.         Objective   Vital Signs:   /80   Pulse 105   Ht 167.6 cm (66\")   Wt 93.4 kg (206 lb)   BMI 33.25 kg/m²     Physical Exam  Vitals and nursing note reviewed.   Constitutional:       Appearance: Normal appearance. He is well-developed.   Musculoskeletal:         General: Normal range of motion.   Skin:     General: Skin is warm and dry.   Neurological:      Mental Status: He is alert and oriented to person, place, and time.   Psychiatric:         Attention and Perception: " Attention normal.         Mood and Affect: Mood normal.         Speech: Speech normal.         Behavior: Behavior normal. Behavior is cooperative.         Thought Content: Thought content normal.         Cognition and Memory: Cognition normal.         Judgment: Judgment normal.        Result Review :                   Assessment and Plan    Problem List Items Addressed This Visit     None      Visit Diagnoses     ADHD (attention deficit hyperactivity disorder), combined type  (Chronic)   -  Primary          Mental Status Exam:   Hygiene:   good  Cooperation:  Cooperative  Eye Contact:  Good  Psychomotor Behavior:  Appropriate  Affect:  Appropriate  Mood: normal  Speech:  Normal  Thought Process:  Goal directed and Linear  Thought Content:  Normal  Suicidal:  None  Homicidal:  None  Hallucinations:  None  Delusion:  None  Memory:  Intact  Orientation:  Person, Place, Time and Situation  Reliability:  good  Insight:  Good  Judgement:  Good  Impulse Control:  Good  Physical/Medical Issues:  No      PHQ-9 Score:   PHQ-9 Total Score: 0    Impression/Plan:  -This is a follow up and medication check.  Shilo reports good focus, concentration, and attention with his stimulant treatment. He returned to school to complete his Master's degree and is working full time at [employer].  His wife is working full-time and will attend medical school in the future.   He is pleased with his current medication regimen and would like to continue at current doses.  -Continue Vyvanse 50 mg daily for ADHD symptoms.  Patient has refills.  -The MEGHAN report, reviewed through PDMP, of the past 12 months were reviewed and is appropriate.  The patient/guardian reports taking the medication only as prescribed.  The patient/guardian denies any abuse or misuse of the medication.  The patient/guardian denies any other substance use or issues.  There are no apparent substance related issues.  The patient reports no side effects of the current  medication usage.  The patient/guardian has reported significant improvement with medication usage and wishes to continue medication as prescribed.  The patient/guardian is appropriate to continue with current medication usage at this time.  Reinforced risks and side effects of medication usage, patient and/or guardian verbalize understanding in their own words and are in agreement with current plan.    MEDS ORDERED DURING VISIT:  No orders of the defined types were placed in this encounter.        Follow Up   Return in about 3 months (around 1/21/2022) for Medication Check.  Patient was given instructions and counseling regarding his condition or for health maintenance advice. Please see specific information pulled into the AVS if appropriate.       TREATMENT PLAN/GOALS: Continue supportive psychotherapy efforts and medications as indicated. Treatment and medication options discussed during today's visit. Patient acknowledged and verbally consented to continue with current treatment plan and was educated on the importance of compliance with treatment and follow-up appointments.    MEDICATION ISSUES:  Discussed medication options and treatment plan of prescribed medication as well as the risks, benefits, and side effects including potential falls, possible impaired driving and metabolic adversities among others. Patient is agreeable to call the office with any worsening of symptoms or onset of side effects. Patient is agreeable to call 911 or go to the nearest ER should he/she begin having SI/HI.        This document has been electronically signed by GILBERT Sánchez, PMHNP-BC  October 21, 2021 17:59 EDT    Part of this note may be an electronic transcription/translation of spoken language to printed text using the Dragon Dictation System.

## 2021-11-12 DIAGNOSIS — F90.2 ADHD (ATTENTION DEFICIT HYPERACTIVITY DISORDER), COMBINED TYPE: ICD-10-CM

## 2021-11-12 NOTE — TELEPHONE ENCOUNTER
RX REQUEST  Rx Refill Note  Requested Prescriptions     Pending Prescriptions Disp Refills   • lisdexamfetamine (VYVANSE) 50 MG capsule 30 capsule 0     Sig: Take 1 capsule by mouth Every Morning      Last office visit with prescribing clinician: 10/21/2021      Next office visit with prescribing clinician: 1/20/2022            Neisha Lehman CMA  11/12/21, 09:51 EST

## 2021-12-14 DIAGNOSIS — F90.2 ADHD (ATTENTION DEFICIT HYPERACTIVITY DISORDER), COMBINED TYPE: ICD-10-CM

## 2022-01-20 ENCOUNTER — TELEMEDICINE (OUTPATIENT)
Dept: PSYCHIATRY | Facility: CLINIC | Age: 27
End: 2022-01-20

## 2022-01-20 DIAGNOSIS — F90.2 ADHD (ATTENTION DEFICIT HYPERACTIVITY DISORDER), COMBINED TYPE: Primary | Chronic | ICD-10-CM

## 2022-01-20 PROCEDURE — 99213 OFFICE O/P EST LOW 20 MIN: CPT | Performed by: NURSE PRACTITIONER

## 2022-01-20 NOTE — PROGRESS NOTES
"This provider is located at [facility], Behavioral [facility] ,[address], [address] in [place], [place],using a secure Rom[employer] Video Visit through [employer]. Patient is being seen remotely via telehealth at their home address in [place], and stated they are in a secure environment for this session. The patient's condition being diagnosed/treated is appropriate for telemedicine. The provider identified herself as well as her credentials.   The patient, and/or patients guardian, consent to be seen remotely, and when consent is given they understand that the consent allows for patient identifiable information to be sent to a third party as needed.   They may refuse to be seen remotely at any time. The electronic data is encrypted and password protected, and the patient and/or guardian has been advised of the potential risks to privacy not withstanding such measures.    Chief Complaint  ADHD, combined type  Subjective          Shilo Kamara presents today via MyChart Video through Epic by himself for a follow up and medication check.     History of Present Illness: Shilo states, \"I am pretty good.\"  Shilo tells me he did well on his two classes last semester and began his third this week for his Master's. He enjoyed [employer]. He and his wife are working. He is compliant with his medications regiment. He is taking Vyvanse 50 mg daily. He denies any side effects of his medication. He denies any problems with sleep appetite. He denies SI/HI/AVH.    Current Medications:   Current Outpatient Medications   Medication Sig Dispense Refill   • fluticasone (Flonase) 50 MCG/ACT nasal spray 2 sprays into the nostril(s) as directed by provider Daily As Needed for Allergies. 16 g 0   • lisdexamfetamine (VYVANSE) 50 MG capsule Take 1 capsule by mouth Every Morning 30 capsule 0   • ondansetron ODT (Zofran ODT) 4 MG disintegrating tablet Place 1-2 tablets on the tongue Every 8 (Eight) Hours As " Needed for Nausea or Vomiting. 20 tablet 0     No current facility-administered medications for this visit.       Objective   Vital Signs:   There were no vitals taken for this visit.    Physical Exam  Nursing note reviewed. Vitals reviewed: Vitals not obtained due to nature of telehealth visit.   Constitutional:       Appearance: Normal appearance.   Neurological:      General: No focal deficit present.      Mental Status: He is alert and oriented to person, place, and time.   Psychiatric:         Attention and Perception: Attention normal.         Mood and Affect: Mood normal.         Speech: Speech normal.         Behavior: Behavior normal. Behavior is cooperative.         Thought Content: Thought content normal.         Cognition and Memory: Cognition normal.         Judgment: Judgment normal.        Result Review :                   Assessment and Plan    Problem List Items Addressed This Visit     None      Visit Diagnoses     ADHD (attention deficit hyperactivity disorder), combined type  (Chronic)   -  Primary    Relevant Medications    lisdexamfetamine (VYVANSE) 50 MG capsule          Mental Status Exam:   Hygiene:   good  Cooperation:  Cooperative  Eye Contact:  Good  Psychomotor Behavior:  Appropriate  Affect:  Appropriate  Mood: normal  Speech:  Normal  Thought Process:  Goal directed and Linear  Thought Content:  Normal  Suicidal:  None  Homicidal:  None  Hallucinations:  None  Delusion:  None  Memory:  Intact  Orientation:  Person, Place, Time and Situation  Reliability:  good  Insight:  Good  Judgement:  Good  Impulse Control:  Good  Physical/Medical Issues:  No      PHQ-9 Score:   PHQ-9 Total Score:      Impression/Plan:  -This is a follow up and medication check.  Shilo reports good focus, concentration, and attention with his stimulant treatment. He returned to school to complete his Master's degree and is working full time at [employer].  His wife is working full-time and will attend medical school  in the future.   He is pleased with his current medication regimen and would like to continue at current doses.  -Continue Vyvanse 50 mg daily for ADHD symptoms.    -The MEGHAN report, reviewed through PDMP, of the past 12 months were reviewed and is appropriate.  The patient/guardian reports taking the medication only as prescribed.  The patient/guardian denies any abuse or misuse of the medication.  The patient/guardian denies any other substance use or issues.  There are no apparent substance related issues.  The patient reports no side effects of the current medication usage.  The patient/guardian has reported significant improvement with medication usage and wishes to continue medication as prescribed.  The patient/guardian is appropriate to continue with current medication usage at this time.  Reinforced risks and side effects of medication usage, patient and/or guardian verbalize understanding in their own words and are in agreement with current plan.    MEDS ORDERED DURING VISIT:  New Medications Ordered This Visit   Medications   • lisdexamfetamine (VYVANSE) 50 MG capsule     Sig: Take 1 capsule by mouth Every Morning     Dispense:  30 capsule     Refill:  0         Follow Up   Return in about 3 months (around 4/20/2022) for Medication Check.  Patient was given instructions and counseling regarding his condition or for health maintenance advice. Please see specific information pulled into the AVS if appropriate.       TREATMENT PLAN/GOALS: Continue supportive psychotherapy efforts and medications as indicated. Treatment and medication options discussed during today's visit. Patient acknowledged and verbally consented to continue with current treatment plan and was educated on the importance of compliance with treatment and follow-up appointments.    MEDICATION ISSUES:  Discussed medication options and treatment plan of prescribed medication as well as the risks, benefits, and side effects including  potential falls, possible impaired driving and metabolic adversities among others. Patient is agreeable to call the office with any worsening of symptoms or onset of side effects. Patient is agreeable to call 911 or go to the nearest ER should he/she begin having SI/HI.      This document has been electronically signed by GILBERT Sánchez, PMHNP-BC  January 20, 2022 09:13 EST    Part of this note may be an electronic transcription/translation of spoken language to printed text using the Dragon Dictation System.

## 2022-01-31 ENCOUNTER — TELEPHONE (OUTPATIENT)
Dept: PSYCHIATRY | Facility: CLINIC | Age: 27
End: 2022-01-31

## 2022-01-31 NOTE — TELEPHONE ENCOUNTER
Shilo called in asking for a letter for accommodations with his school. He said it needed to include his diagnoses, date of dx, treatment, inplementations of dx. He is asking for extension on writing assignments, and extra time for testing. Please advise. Thank you

## 2022-02-23 DIAGNOSIS — F90.2 ADHD (ATTENTION DEFICIT HYPERACTIVITY DISORDER), COMBINED TYPE: Chronic | ICD-10-CM

## 2022-02-23 NOTE — TELEPHONE ENCOUNTER
Rx Refill Note  Requested Prescriptions     Pending Prescriptions Disp Refills   • lisdexamfetamine (VYVANSE) 50 MG capsule 30 capsule 0     Sig: Take 1 capsule by mouth Every Morning      Last office visit with prescribing clinician: 10/21/2021      Next office visit with prescribing clinician: 4/20/2022            Tash Wheat MA  02/23/22, 10:18 EST

## 2022-03-05 ENCOUNTER — HOSPITAL ENCOUNTER (EMERGENCY)
Facility: HOSPITAL | Age: 27
Discharge: HOME | End: 2022-03-05
Attending: EMERGENCY MEDICINE | Admitting: EMERGENCY MEDICINE

## 2022-03-05 ENCOUNTER — APPOINTMENT (OUTPATIENT)
Dept: CT IMAGING | Facility: HOSPITAL | Age: 27
End: 2022-03-05

## 2022-03-05 VITALS
TEMPERATURE: 98.2 F | WEIGHT: 211.4 LBS | OXYGEN SATURATION: 98 % | DIASTOLIC BLOOD PRESSURE: 65 MMHG | SYSTOLIC BLOOD PRESSURE: 105 MMHG | HEART RATE: 90 BPM | BODY MASS INDEX: 33.97 KG/M2 | RESPIRATION RATE: 16 BRPM | HEIGHT: 66 IN

## 2022-03-05 DIAGNOSIS — N20.1 RIGHT URETERAL STONE: Primary | ICD-10-CM

## 2022-03-05 LAB
ALBUMIN SERPL-MCNC: 4.4 G/DL (ref 3.5–5.2)
ALBUMIN/GLOB SERPL: 1.5 G/DL
ALP SERPL-CCNC: 94 U/L (ref 39–117)
ALT SERPL W P-5'-P-CCNC: 33 U/L (ref 1–41)
ANION GAP SERPL CALCULATED.3IONS-SCNC: 10.6 MMOL/L (ref 5–15)
AST SERPL-CCNC: 20 U/L (ref 1–40)
BASOPHILS # BLD AUTO: 0.08 10*3/MM3 (ref 0–0.2)
BASOPHILS NFR BLD AUTO: 0.8 % (ref 0–1.5)
BILIRUB SERPL-MCNC: 0.2 MG/DL (ref 0–1.2)
BILIRUB UR QL STRIP: NEGATIVE
BUN SERPL-MCNC: 12 MG/DL (ref 6–20)
BUN/CREAT SERPL: 13.8 (ref 7–25)
CALCIUM SPEC-SCNC: 9.6 MG/DL (ref 8.6–10.5)
CHLORIDE SERPL-SCNC: 101 MMOL/L (ref 98–107)
CLARITY UR: CLEAR
CO2 SERPL-SCNC: 25.4 MMOL/L (ref 22–29)
COLOR UR: YELLOW
CREAT SERPL-MCNC: 0.87 MG/DL (ref 0.76–1.27)
DEPRECATED RDW RBC AUTO: 38.4 FL (ref 37–54)
EGFRCR SERPLBLD CKD-EPI 2021: 122 ML/MIN/1.73
EOSINOPHIL # BLD AUTO: 0.77 10*3/MM3 (ref 0–0.4)
EOSINOPHIL NFR BLD AUTO: 7.5 % (ref 0.3–6.2)
ERYTHROCYTE [DISTWIDTH] IN BLOOD BY AUTOMATED COUNT: 11.9 % (ref 12.3–15.4)
GLOBULIN UR ELPH-MCNC: 2.9 GM/DL
GLUCOSE SERPL-MCNC: 137 MG/DL (ref 65–99)
GLUCOSE UR STRIP-MCNC: NEGATIVE MG/DL
HCT VFR BLD AUTO: 44.9 % (ref 37.5–51)
HGB BLD-MCNC: 15.6 G/DL (ref 13–17.7)
HGB UR QL STRIP.AUTO: NEGATIVE
HOLD SPECIMEN: NORMAL
HOLD SPECIMEN: NORMAL
IMM GRANULOCYTES # BLD AUTO: 0.03 10*3/MM3 (ref 0–0.05)
IMM GRANULOCYTES NFR BLD AUTO: 0.3 % (ref 0–0.5)
KETONES UR QL STRIP: ABNORMAL
LEUKOCYTE ESTERASE UR QL STRIP.AUTO: NEGATIVE
LIPASE SERPL-CCNC: 27 U/L (ref 13–60)
LYMPHOCYTES # BLD AUTO: 3.49 10*3/MM3 (ref 0.7–3.1)
LYMPHOCYTES NFR BLD AUTO: 34.1 % (ref 19.6–45.3)
MCH RBC QN AUTO: 30.4 PG (ref 26.6–33)
MCHC RBC AUTO-ENTMCNC: 34.7 G/DL (ref 31.5–35.7)
MCV RBC AUTO: 87.4 FL (ref 79–97)
MONOCYTES # BLD AUTO: 0.97 10*3/MM3 (ref 0.1–0.9)
MONOCYTES NFR BLD AUTO: 9.5 % (ref 5–12)
NEUTROPHILS NFR BLD AUTO: 4.89 10*3/MM3 (ref 1.7–7)
NEUTROPHILS NFR BLD AUTO: 47.8 % (ref 42.7–76)
NITRITE UR QL STRIP: NEGATIVE
NRBC BLD AUTO-RTO: 0 /100 WBC (ref 0–0.2)
PH UR STRIP.AUTO: <=5 [PH] (ref 5–8)
PLATELET # BLD AUTO: 418 10*3/MM3 (ref 140–450)
PMV BLD AUTO: 9.1 FL (ref 6–12)
POTASSIUM SERPL-SCNC: 4.4 MMOL/L (ref 3.5–5.2)
PROT SERPL-MCNC: 7.3 G/DL (ref 6–8.5)
PROT UR QL STRIP: NEGATIVE
RBC # BLD AUTO: 5.14 10*6/MM3 (ref 4.14–5.8)
SODIUM SERPL-SCNC: 137 MMOL/L (ref 136–145)
SP GR UR STRIP: 1.02 (ref 1–1.03)
UROBILINOGEN UR QL STRIP: ABNORMAL
WBC NRBC COR # BLD: 10.23 10*3/MM3 (ref 3.4–10.8)
WHOLE BLOOD HOLD SPECIMEN: NORMAL
WHOLE BLOOD HOLD SPECIMEN: NORMAL

## 2022-03-05 PROCEDURE — 25010000002 MORPHINE PER 10 MG: Performed by: EMERGENCY MEDICINE

## 2022-03-05 PROCEDURE — 85025 COMPLETE CBC W/AUTO DIFF WBC: CPT

## 2022-03-05 PROCEDURE — 96374 THER/PROPH/DIAG INJ IV PUSH: CPT

## 2022-03-05 PROCEDURE — 74176 CT ABD & PELVIS W/O CONTRAST: CPT

## 2022-03-05 PROCEDURE — 25010000002 ONDANSETRON PER 1 MG: Performed by: EMERGENCY MEDICINE

## 2022-03-05 PROCEDURE — 80053 COMPREHEN METABOLIC PANEL: CPT

## 2022-03-05 PROCEDURE — 25010000002 ONDANSETRON PER 1 MG: Performed by: PHYSICIAN ASSISTANT

## 2022-03-05 PROCEDURE — 99283 EMERGENCY DEPT VISIT LOW MDM: CPT

## 2022-03-05 PROCEDURE — 96376 TX/PRO/DX INJ SAME DRUG ADON: CPT

## 2022-03-05 PROCEDURE — 81003 URINALYSIS AUTO W/O SCOPE: CPT

## 2022-03-05 PROCEDURE — 83690 ASSAY OF LIPASE: CPT

## 2022-03-05 PROCEDURE — 96375 TX/PRO/DX INJ NEW DRUG ADDON: CPT

## 2022-03-05 PROCEDURE — 25010000002 KETOROLAC TROMETHAMINE PER 15 MG: Performed by: PHYSICIAN ASSISTANT

## 2022-03-05 RX ORDER — SODIUM CHLORIDE 0.9 % (FLUSH) 0.9 %
10 SYRINGE (ML) INJECTION AS NEEDED
Status: DISCONTINUED | OUTPATIENT
Start: 2022-03-05 | End: 2022-03-05 | Stop reason: HOSPADM

## 2022-03-05 RX ORDER — KETOROLAC TROMETHAMINE 30 MG/ML
30 INJECTION, SOLUTION INTRAMUSCULAR; INTRAVENOUS ONCE
Status: COMPLETED | OUTPATIENT
Start: 2022-03-05 | End: 2022-03-05

## 2022-03-05 RX ORDER — ONDANSETRON 4 MG/1
4 TABLET, ORALLY DISINTEGRATING ORAL EVERY 6 HOURS PRN
Qty: 20 TABLET | Refills: 0 | Status: SHIPPED | OUTPATIENT
Start: 2022-03-05 | End: 2022-04-20

## 2022-03-05 RX ORDER — MORPHINE SULFATE 4 MG/ML
4 INJECTION, SOLUTION INTRAMUSCULAR; INTRAVENOUS ONCE
Status: COMPLETED | OUTPATIENT
Start: 2022-03-05 | End: 2022-03-05

## 2022-03-05 RX ORDER — ONDANSETRON 2 MG/ML
4 INJECTION INTRAMUSCULAR; INTRAVENOUS ONCE
Status: COMPLETED | OUTPATIENT
Start: 2022-03-05 | End: 2022-03-05

## 2022-03-05 RX ORDER — HYDROCODONE BITARTRATE AND ACETAMINOPHEN 5; 325 MG/1; MG/1
1 TABLET ORAL EVERY 6 HOURS PRN
Qty: 12 TABLET | Refills: 0 | Status: SHIPPED | OUTPATIENT
Start: 2022-03-05 | End: 2022-04-20

## 2022-03-05 RX ADMIN — KETOROLAC TROMETHAMINE 30 MG: 30 INJECTION, SOLUTION INTRAMUSCULAR; INTRAVENOUS at 10:19

## 2022-03-05 RX ADMIN — MORPHINE SULFATE 4 MG: 4 INJECTION, SOLUTION INTRAMUSCULAR; INTRAVENOUS at 11:26

## 2022-03-05 RX ADMIN — ONDANSETRON 4 MG: 2 INJECTION INTRAMUSCULAR; INTRAVENOUS at 10:19

## 2022-03-05 RX ADMIN — ONDANSETRON 4 MG: 2 INJECTION INTRAMUSCULAR; INTRAVENOUS at 11:26

## 2022-03-05 NOTE — EXTERNAL PATIENT INSTRUCTIONS
Patient Education   Table of Contents       Kidney Stones     To view videos and all your education online visit,   [email or web address]   or scan this QR code with your smartphone.                  Kidney Stones        Kidney stones are solid, rock-like deposits that form inside of the kidneys. The kidneys are a pair of organs that make urine. A kidney stone may form in a kidney and move into other parts of the urinary tract, including the tubes that connect the kidneys to the bladder (ureters), the bladder, and the tube that carries urine out of the body (urethra). As the stone moves through these areas, it can cause intense pain and block the flow of urine.   Kidney stones are created when high levels of certain minerals are found in the urine. The stones are usually passed out of the body through urination, but in some cases, medical treatment may be needed to remove them.     What are the causes?    Kidney stones may be caused by:       A condition in which certain glands produce too much parathyroid hormone (primary hyperparathyroidism), which causes too much calcium buildup in the blood.       A buildup of uric acid crystals in the bladder (hyperuricosuria). Uric acid is a chemical that the body produces when you eat certain foods. It usually exits the body in the urine.       Narrowing (stricture) of one or both of the ureters.       A kidney blockage that is present at birth (congenital obstruction).       Past surgery on the kidney or the ureters, such as gastric bypass surgery.     What increases the risk?    The following factors may make you more likely to develop this condition:       Having had a kidney stone in the past.       Having a family history of kidney stones.       Not drinking enough water.       Eating a diet that is high in protein, salt (sodium), or sugar.       Being overweight or obese.     What are the signs or symptoms?    Symptoms of a kidney stone may include:       Pain  in the side of the abdomen, right below the ribs (flank pain). Pain usually spreads (radiates) to the groin.       Needing to urinate frequently or urgently.       Painful urination.       Blood in the urine (hematuria).       Nausea.       Vomiting.       Fever and chills.     How is this diagnosed?    This condition may be diagnosed based on:       Your symptoms and medical history.       A physical exam.       Blood tests.       Urine tests. These may be done before and after the stone passes out of your body through urination.       Imaging tests, such as a CT scan, abdominal X-ray, or ultrasound.       A procedure to examine the inside of the bladder (cystoscopy).     How is this treated?    Treatment for kidney stones depends on the size, location, and makeup of the stones. Kidney stones will often pass out of the body through urination. You may need to:       Increase your fluid intake to help pass the stone. In some cases, you may be given fluids through an IV and may need to be monitored at the hospital.       Take medicine for pain.       Make changes in your diet to help prevent kidney stones from coming back.      Sometimes, medical procedures are needed to remove a kidney stone. This may involve:      A procedure to break up kidney stones using:       A focused beam of light (laser therapy).       Shock waves (extracorporeal shock wave lithotripsy).       Surgery to remove kidney stones. This may be needed if you have severe pain or have stones that block your urinary tract.     Follow these instructions at home:   Medicines         Take over-the-counter and prescription medicines only as told by your health care provider.       Ask your health care provider if the medicine prescribed to you requires you to avoid driving or using heavy machinery.     Eating and drinking         Drink enough fluid to keep your urine pale yellow. You may be instructed to drink at least 8?10 glasses of water each day. This  will help you pass the kidney stone.      If directed, change your diet. This may include:       Limiting how much sodium you eat.       Eating more fruits and vegetables.       Limiting how much animal protein?such as red meat, poultry, fish, and eggs?you eat.       Follow instructions from your health care provider about eating or drinking restrictions.     General instructions        Collect urine samples as told by your health care provider. You may need to collect a urine sample:       24 hours after you pass the stone.       8?12 weeks after passing the kidney stone, and every 6?12 months after that.       Strain your urine every time you urinate, for as long as directed. Use the strainer that your health care provider recommends.      Do not  throw out the kidney stone after passing it. Keep the stone so it can be tested by your health care provider. Testing the makeup of your kidney stone may help prevent you from getting kidney stones in the future.       Keep all follow-up visits as told by your health care provider. This is important. You may need follow-up X-rays or ultrasounds to make sure that your stone has passed.     How is this prevented?       To prevent another kidney stone:       Drink enough fluid to keep your urine pale yellow. This is the best way to prevent kidney stones.       Eat a healthy diet and follow recommendations from your health care provider about foods to avoid. You may be instructed to eat a low-protein diet. Recommendations vary depending on the type of kidney stone that you have.       Maintain a healthy weight.       Where to find more information         National Kidney Foundation (NKF): www.kidney.org         [facility] (UCF): www.urologyhealth.org       Contact a health care provider if:         You have pain that gets worse or does not get better with medicine.     Get help right away if:         You have a fever or chills.       You develop severe pain.        You develop new abdominal pain.       You faint.       You are unable to urinate.     Summary         Kidney stones are solid, rock-like deposits that form inside of the kidneys.       Kidney stones can cause nausea, vomiting, blood in the urine, abdominal pain, and the urge to urinate frequently.       Treatment for kidney stones depends on the size, location, and makeup of the stones. Kidney stones will often pass out of the body through urination.       Kidney stones can be prevented by drinking enough fluids, eating a healthy diet, and maintaining a healthy weight.     This information is not intended to replace advice given to you by your health care provider. Make sure you discuss any questions you have with your health care provider.     Document Released: 12/18/2006Document Revised: 05/05/2020Document Reviewed: 05/05/2020     [employer] Patient Education ? 2021 [employer]

## 2022-03-05 NOTE — ED PROVIDER NOTES
Subjective   Chief Complaint: Right flank and right lower quadrant  History of Present Illness: 26-year-old male comes in for evaluation above complaint.  He had acute onset of right flank pain this morning upon awakening that radiates around his right lower quadrant.  Nausea no vomiting.  Normal bowel movements.  No urinary symptoms.  No history of abdominal surgeries.  No history of kidney stones.  Onset: Upon awakening this morning  Timing: Constant ongoing  Exacerbating / Alleviating factors: None  Associated symptoms: None      Nurses Notes reviewed and agree, including vitals, allergies, social history and prior medical history.          Review of Systems   Constitutional: Negative.    HENT: Negative.    Eyes: Negative.    Respiratory: Negative.    Cardiovascular: Negative.    Gastrointestinal: Positive for abdominal pain and nausea.   Genitourinary: Positive for flank pain.   Skin: Negative.    Allergic/Immunologic: Negative.    Neurological: Negative.    Psychiatric/Behavioral: Negative.    All other systems reviewed and are negative.      Past Medical History:   Diagnosis Date   • ADHD (attention deficit hyperactivity disorder)    • Hx of balanitis     1 year ago   • Seasonal allergies        No Known Allergies    Past Surgical History:   Procedure Laterality Date   • SHOULDER SURGERY Left    • SHOULDER SURGERY Left    • TONSILLECTOMY         Family History   Problem Relation Age of Onset   • No Known Problems Mother    • No Known Problems Father    • Dementia Maternal Grandmother    • Drug abuse Cousin    • ADD / ADHD Neg Hx    • Alcohol abuse Neg Hx    • Anxiety disorder Neg Hx    • Bipolar disorder Neg Hx    • Depression Neg Hx    • OCD Neg Hx    • Paranoid behavior Neg Hx    • Schizophrenia Neg Hx    • Seizures Neg Hx    • Self-Injurious Behavior  Neg Hx    • Suicide Attempts Neg Hx        Social History     Socioeconomic History   • Marital status:    Tobacco Use   • Smoking status: Never Smoker    • Smokeless tobacco: Never Used   Vaping Use   • Vaping Use: Never used   Substance and Sexual Activity   • Alcohol use: Not Currently   • Drug use: No   • Sexual activity: Defer           Objective   Physical Exam  Vitals and nursing note reviewed.   Constitutional:       Appearance: He is well-developed.   HENT:      Head: Normocephalic and atraumatic.   Cardiovascular:      Rate and Rhythm: Normal rate.   Pulmonary:      Effort: Pulmonary effort is normal.   Abdominal:      General: Abdomen is flat.      Palpations: Abdomen is soft.      Tenderness: There is no abdominal tenderness. There is no right CVA tenderness, left CVA tenderness, guarding or rebound.   Skin:     General: Skin is warm and dry.   Neurological:      General: No focal deficit present.      Mental Status: He is alert.   Psychiatric:         Mood and Affect: Mood normal.         Behavior: Behavior normal.         Procedures           ED Course                                                 MDM   CT scan per radiologist consistent with distal right ureteral stone.  Also note made of the right testicle in the inguinal canal.  Patient denies any testicular pain.  On exam right testicle does appear to be palpable at this time patient states that time he does feel as though the testicle will ascend upwards at times and this is normal for him.  Again he has no testicular pain.  Final diagnoses:   Right ureteral stone       ED Disposition  ED Disposition     ED Disposition   Discharge    Condition   Stable    Comment   --             Riaz Escobar MD  [address] [address] [phone]      As needed         Medication List      No changes were made to your prescriptions during this visit.          Nasir Marquez PA-C  03/05/22 [id number]

## 2022-03-30 DIAGNOSIS — F90.2 ADHD (ATTENTION DEFICIT HYPERACTIVITY DISORDER), COMBINED TYPE: Chronic | ICD-10-CM

## 2022-03-30 NOTE — TELEPHONE ENCOUNTER
Rx Refill Note  Requested Prescriptions     Pending Prescriptions Disp Refills   • lisdexamfetamine (VYVANSE) 50 MG capsule 30 capsule 0     Sig: Take 1 capsule by mouth Every Morning      Last office visit with prescribing clinician: 10/21/2021      Next office visit with prescribing clinician: 4/20/2022            Brenda Cooper MA  03/30/22, 13:25 EDT

## 2022-04-20 ENCOUNTER — OFFICE VISIT (OUTPATIENT)
Dept: PSYCHIATRY | Facility: CLINIC | Age: 27
End: 2022-04-20

## 2022-04-20 VITALS
SYSTOLIC BLOOD PRESSURE: 118 MMHG | HEIGHT: 66 IN | DIASTOLIC BLOOD PRESSURE: 68 MMHG | HEART RATE: 105 BPM | WEIGHT: 212 LBS | BODY MASS INDEX: 34.07 KG/M2

## 2022-04-20 DIAGNOSIS — Z79.899 MEDICATION MANAGEMENT: ICD-10-CM

## 2022-04-20 DIAGNOSIS — F90.2 ADHD (ATTENTION DEFICIT HYPERACTIVITY DISORDER), COMBINED TYPE: Primary | Chronic | ICD-10-CM

## 2022-04-20 PROCEDURE — 99213 OFFICE O/P EST LOW 20 MIN: CPT | Performed by: NURSE PRACTITIONER

## 2022-04-20 NOTE — PROGRESS NOTES
"    Chief Complaint  ADHD, combined type  Subjective          Shilo Kamara presents today to [facility] by himself for a follow up and medication check.     History of Present Illness: Shilo states, \"I am good.\" Shilo tells me he feels his ADHD medication works better on some days. He reports having more good days of focus, concentration, and attention versus bad days. He reports having a harder time with motivation and getting prepared when he I tired. He is working more. He has As in his classes. He is unsure if he will take graduate classes this summer but tells me he would like to finish ASAP.  He is compliant with his medications regiment. He is taking Vyvanse 50 mg daily. He denies any side effects of his medication. He denies any problems with sleep appetite. He denies SI/HI/AVH.    Current Medications:   Current Outpatient Medications   Medication Sig Dispense Refill   • fluticasone (Flonase) 50 MCG/ACT nasal spray 2 sprays into the nostril(s) as directed by provider Daily As Needed for Allergies. 16 g 0   • lisdexamfetamine (VYVANSE) 50 MG capsule Take 1 capsule by mouth Every Morning 30 capsule 0     No current facility-administered medications for this visit.       Objective   Vital Signs:   /68   Pulse 105   Ht 167.6 cm (66\")   Wt 96.2 kg (212 lb)   BMI 34.22 kg/m²     Physical Exam  Nursing note reviewed. Vitals reviewed: Vitals not obtained due to nature of telehealth visit.   Constitutional:       Appearance: Normal appearance.   Neurological:      General: No focal deficit present.      Mental Status: He is alert and oriented to person, place, and time.   Psychiatric:         Attention and Perception: Attention normal.         Mood and Affect: Mood normal.         Speech: Speech normal.         Behavior: Behavior normal. Behavior is cooperative.         Thought Content: Thought content normal.         Cognition and Memory: Cognition normal. "         Judgment: Judgment normal.        Result Review :                   Assessment and Plan    Problem List Items Addressed This Visit    None     Visit Diagnoses     ADHD (attention deficit hyperactivity disorder), combined type  (Chronic)   -  Primary    Medication management        Relevant Orders    Compliance Drug Analysis, Ur - Urine, Clean Catch          Mental Status Exam:   Hygiene:   good  Cooperation:  Cooperative  Eye Contact:  Good  Psychomotor Behavior:  Appropriate  Affect:  Appropriate  Mood: normal  Speech:  Normal  Thought Process:  Goal directed and Linear  Thought Content:  Normal  Suicidal:  None  Homicidal:  None  Hallucinations:  None  Delusion:  None  Memory:  Intact  Orientation:  Person, Place, Time and Situation  Reliability:  good  Insight:  Good  Judgement:  Good  Impulse Control:  Good  Physical/Medical Issues:  No      PHQ-9 Score:   PHQ-9 Total Score: 0  Impression/Plan:  -This is a follow up and medication check.  Shilo reports having some days he does not feel his medication works. He feels he is more tired then. 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 I discussed the effects of tolerance of medication and I do not feel this situation warrants a dose increase. He agrees. He is trying to take time for himself when needed. He has As and I explained that it is okay for him to get lower grades if he is struggling. He tells me that he would like to achieve and perform at a high standard.He is sleeping and eating well.  He is pleased with his current medication and would like to continue at current dose.   -Continue Vyvanse 50 mg daily for ADHD symptoms.  Patient has refills.  -The MEGHAN report, reviewed through PDMP, of the past 12 months were reviewed and is appropriate.  The patient/guardian reports taking the medication only as prescribed.  The patient/guardian denies any abuse or misuse of the medication.   The patient/guardian denies any other substance use or issues.  There are no apparent substance related issues.  The patient reports no side effects of the current medication usage.  The patient/guardian has reported significant improvement with medication usage and wishes to continue medication as prescribed.  The patient/guardian is appropriate to continue with current medication usage at this time.  Reinforced risks and side effects of medication usage, patient and/or guardian verbalize understanding in their own words and are in agreement with current plan.    MEDS ORDERED DURING VISIT:  No orders of the defined types were placed in this encounter.        Follow Up   Return in about 3 months (around 7/20/2022) for Medication Check.  Patient was given instructions and counseling regarding his condition or for health maintenance advice. Please see specific information pulled into the AVS if appropriate.       TREATMENT PLAN/GOALS: Continue supportive psychotherapy efforts and medications as indicated. Treatment and medication options discussed during today's visit. Patient acknowledged and verbally consented to continue with current treatment plan and was educated on the importance of compliance with treatment and follow-up appointments.    MEDICATION ISSUES:  Discussed medication options and treatment plan of prescribed medication as well as the risks, benefits, and side effects including potential falls, possible impaired driving and metabolic adversities among others. Patient is agreeable to call the office with any worsening of symptoms or onset of side effects. Patient is agreeable to call 911 or go to the nearest ER should he/she begin having SI/HI.      This document has been electronically signed by GILBERT Sánchez, PMHNP-BC  April 20, 2022 14:45 EDT    Part of this note may be an electronic transcription/translation of spoken language to printed text using the Dragon Dictation System.

## 2022-04-26 LAB — DRUGS UR: NORMAL

## 2022-05-05 DIAGNOSIS — F90.2 ADHD (ATTENTION DEFICIT HYPERACTIVITY DISORDER), COMBINED TYPE: Chronic | ICD-10-CM

## 2022-05-05 NOTE — TELEPHONE ENCOUNTER
Rx Refill Note  Requested Prescriptions     Pending Prescriptions Disp Refills   • lisdexamfetamine (VYVANSE) 50 MG capsule 30 capsule 0     Sig: Take 1 capsule by mouth Every Morning      Last office visit with prescribing clinician: 4/20/2022      Next office visit with prescribing clinician: 7/20/2022            Neisha Lehman CMA  05/05/22, 15:50 EDT

## 2022-06-06 DIAGNOSIS — F90.2 ADHD (ATTENTION DEFICIT HYPERACTIVITY DISORDER), COMBINED TYPE: Chronic | ICD-10-CM

## 2022-06-06 NOTE — TELEPHONE ENCOUNTER
Rx Refill Note  Requested Prescriptions     Pending Prescriptions Disp Refills   • lisdexamfetamine (VYVANSE) 50 MG capsule 30 capsule 0     Sig: Take 1 capsule by mouth Every Morning      Last office visit with prescribing clinician: 4/20/2022      Next office visit with prescribing clinician: 7/20/2022            Brenda Cooper MA  06/06/22, 13:03 EDT

## 2022-07-08 DIAGNOSIS — F90.2 ADHD (ATTENTION DEFICIT HYPERACTIVITY DISORDER), COMBINED TYPE: Chronic | ICD-10-CM

## 2022-08-02 NOTE — PROGRESS NOTES
"  Chief Complaint  ADHD, combined type  Subjective          Shilo Kamara presents today to [facility] by himself for a follow up and medication check.     History of Present Illness: Shilo states, \"I am pretty good.\" Shilo just returned from a vacation from [place]. He enjoyed his trip. He is taking a break from class this summer but continues to work. He is taking two classes this fall and plans to graduate in 2024. He has a 4.0 GPA. He reports good focus, concentration, and attention. He had 5 stitches placed on his left posterior leg after a glass bottle fell from his shopping cart and broke. He was treated and released in the ED.   He is compliant with his medications regiment. He is taking Vyvanse 50 mg daily. He denies any side effects of his medication. He denies any problems with sleep appetite. He denies SI/HI/AVH.    Current Medications:   Current Outpatient Medications   Medication Sig Dispense Refill   • fluticasone (Flonase) 50 MCG/ACT nasal spray 2 sprays into the nostril(s) as directed by provider Daily As Needed for Allergies. 16 g 0   • lisdexamfetamine (VYVANSE) 50 MG capsule Take 1 capsule by mouth Every Morning 30 capsule 0     No current facility-administered medications for this visit.       Objective   Vital Signs:   /78   Pulse 85   Ht 170.2 cm (67\")   Wt 99.8 kg (220 lb)   BMI 34.46 kg/m²     Physical Exam  Nursing note reviewed.   Constitutional:       Appearance: Normal appearance. He is normal weight.   Musculoskeletal:         General: Normal range of motion.   Skin:     General: Skin is warm and dry.   Neurological:      General: No focal deficit present.      Mental Status: He is alert and oriented to person, place, and time.   Psychiatric:         Attention and Perception: Attention normal.         Mood and Affect: Mood normal.         Speech: Speech normal.         Behavior: Behavior normal. Behavior is cooperative.         " Left message for patient to call back.     Thought Content: Thought content normal.         Cognition and Memory: Cognition normal.         Judgment: Judgment normal.        Result Review :       The following data was reviewed by GILBERT Cheema on 08/01/2022:      with Jessica Malone APRN (07/22/2022)         Assessment and Plan    Problem List Items Addressed This Visit    None     Visit Diagnoses     ADHD (attention deficit hyperactivity disorder), combined type  (Chronic)   -  Primary          Mental Status Exam:   Hygiene:   good  Cooperation:  Cooperative  Eye Contact:  Good  Psychomotor Behavior:  Appropriate  Affect:  Appropriate  Mood: normal  Speech:  Normal  Thought Process:  Goal directed and Linear  Thought Content:  Normal  Suicidal:  None  Homicidal:  None  Hallucinations:  None  Delusion:  None  Memory:  Intact  Orientation:  Person, Place, Time and Situation  Reliability:  good  Insight:  Good  Judgement:  Good  Impulse Control:  Good  Physical/Medical Issues:  No      PHQ-9 Score:   PHQ-9 Total Score: 0  Impression/Plan:  -This is a follow up and medication check.  Shilo reports a stable mood. He reports good focus, concentration, and attention. He is working and returning to school in the fall. He is sleeping and eating well. He is pleased with his current medication regiment.   -Continue Vyvanse 50 mg daily for ADHD symptoms.  Patient has refills.  -The MEGHAN report, reviewed through PDMP, of the past 12 months were reviewed and is appropriate.  The patient/guardian reports taking the medication only as prescribed.  The patient/guardian denies any abuse or misuse of the medication.  The patient/guardian denies any other substance use or issues.  There are no apparent substance related issues.  The patient reports no side effects of the current medication usage.  The patient/guardian has reported significant improvement with medication usage and wishes to continue medication as prescribed.  The patient/guardian is  appropriate to continue with current medication usage at this time.  Reinforced risks and side effects of medication usage, patient and/or guardian verbalize understanding in their own words and are in agreement with current plan.  -Last UDS 04/20/2022. Appropriate.     MEDS ORDERED DURING VISIT:  No orders of the defined types were placed in this encounter.        Follow Up   Return in about 6 months (around 2/3/2023) for Medication Check.  Patient was given instructions and counseling regarding his condition or for health maintenance advice. Please see specific information pulled into the AVS if appropriate.       TREATMENT PLAN/GOALS: Continue supportive psychotherapy efforts and medications as indicated. Treatment and medication options discussed during today's visit. Patient acknowledged and verbally consented to continue with current treatment plan and was educated on the importance of compliance with treatment and follow-up appointments.    MEDICATION ISSUES:  Discussed medication options and treatment plan of prescribed medication as well as the risks, benefits, and side effects including potential falls, possible impaired driving and metabolic adversities among others. Patient is agreeable to call the office with any worsening of symptoms or onset of side effects. Patient is agreeable to call 911 or go to the nearest ER should he/she begin having SI/HI.      This document has been electronically signed by GILBERT Sánchez, PMHNP-BC  August 3, 2022 18:05 EDT    Part of this note may be an electronic transcription/translation of spoken language to printed text using the Dragon Dictation System.

## 2022-08-03 ENCOUNTER — OFFICE VISIT (OUTPATIENT)
Dept: PSYCHIATRY | Facility: CLINIC | Age: 27
End: 2022-08-03

## 2022-08-03 VITALS
SYSTOLIC BLOOD PRESSURE: 128 MMHG | WEIGHT: 220 LBS | BODY MASS INDEX: 34.53 KG/M2 | DIASTOLIC BLOOD PRESSURE: 78 MMHG | HEIGHT: 67 IN | HEART RATE: 85 BPM

## 2022-08-03 DIAGNOSIS — F90.2 ADHD (ATTENTION DEFICIT HYPERACTIVITY DISORDER), COMBINED TYPE: Primary | Chronic | ICD-10-CM

## 2022-08-03 PROCEDURE — 99213 OFFICE O/P EST LOW 20 MIN: CPT | Performed by: NURSE PRACTITIONER

## 2022-08-14 DIAGNOSIS — F90.2 ADHD (ATTENTION DEFICIT HYPERACTIVITY DISORDER), COMBINED TYPE: Chronic | ICD-10-CM

## 2022-09-03 ENCOUNTER — TRANSCRIBE ORDERS (OUTPATIENT)
Dept: LAB | Facility: HOSPITAL | Age: 27
End: 2022-09-03

## 2022-09-03 ENCOUNTER — LAB (OUTPATIENT)
Dept: LAB | Facility: HOSPITAL | Age: 27
End: 2022-09-03

## 2022-09-03 DIAGNOSIS — Z23 NEED FOR PROPHYLACTIC VACCINATION AND INOCULATION AGAINST CHOLERA ALONE: ICD-10-CM

## 2022-09-03 DIAGNOSIS — I10 ESSENTIAL HYPERTENSION, MALIGNANT: ICD-10-CM

## 2022-09-03 DIAGNOSIS — E07.9 DISEASE OF THYROID GLAND: ICD-10-CM

## 2022-09-03 DIAGNOSIS — Z13.220 SCREENING FOR LIPOID DISORDERS: ICD-10-CM

## 2022-09-03 DIAGNOSIS — F90.9 ATTENTION DEFICIT HYPERACTIVITY DISORDER (ADHD), UNSPECIFIED ADHD TYPE: ICD-10-CM

## 2022-09-03 DIAGNOSIS — F90.9 ATTENTION DEFICIT HYPERACTIVITY DISORDER (ADHD), UNSPECIFIED ADHD TYPE: Primary | ICD-10-CM

## 2022-09-03 LAB
ALBUMIN SERPL-MCNC: 4.8 G/DL (ref 3.5–5.2)
ALBUMIN/GLOB SERPL: 2.2 G/DL
ALP SERPL-CCNC: 89 U/L (ref 39–117)
ALT SERPL W P-5'-P-CCNC: 28 U/L (ref 1–41)
ANION GAP SERPL CALCULATED.3IONS-SCNC: 12 MMOL/L (ref 5–15)
AST SERPL-CCNC: 18 U/L (ref 1–40)
BILIRUB SERPL-MCNC: 0.6 MG/DL (ref 0–1.2)
BUN SERPL-MCNC: 13 MG/DL (ref 6–20)
BUN/CREAT SERPL: 10.9 (ref 7–25)
CALCIUM SPEC-SCNC: 9.8 MG/DL (ref 8.6–10.5)
CHLORIDE SERPL-SCNC: 103 MMOL/L (ref 98–107)
CHOLEST SERPL-MCNC: 106 MG/DL (ref 0–200)
CO2 SERPL-SCNC: 26 MMOL/L (ref 22–29)
CREAT SERPL-MCNC: 1.19 MG/DL (ref 0.76–1.27)
DEPRECATED RDW RBC AUTO: 38.2 FL (ref 37–54)
EGFRCR SERPLBLD CKD-EPI 2021: 86.4 ML/MIN/1.73
ERYTHROCYTE [DISTWIDTH] IN BLOOD BY AUTOMATED COUNT: 11.9 % (ref 12.3–15.4)
GLOBULIN UR ELPH-MCNC: 2.2 GM/DL
GLUCOSE SERPL-MCNC: 100 MG/DL (ref 65–99)
HCT VFR BLD AUTO: 47.7 % (ref 37.5–51)
HDLC SERPL-MCNC: 43 MG/DL (ref 40–60)
HGB BLD-MCNC: 16.2 G/DL (ref 13–17.7)
LDLC SERPL CALC-MCNC: 49 MG/DL (ref 0–100)
LDLC/HDLC SERPL: 1.17 {RATIO}
MCH RBC QN AUTO: 29.8 PG (ref 26.6–33)
MCHC RBC AUTO-ENTMCNC: 34 G/DL (ref 31.5–35.7)
MCV RBC AUTO: 87.7 FL (ref 79–97)
PLATELET # BLD AUTO: 363 10*3/MM3 (ref 140–450)
PMV BLD AUTO: 9.3 FL (ref 6–12)
POTASSIUM SERPL-SCNC: 4.6 MMOL/L (ref 3.5–5.2)
PROT SERPL-MCNC: 7 G/DL (ref 6–8.5)
RBC # BLD AUTO: 5.44 10*6/MM3 (ref 4.14–5.8)
SODIUM SERPL-SCNC: 141 MMOL/L (ref 136–145)
T4 FREE SERPL-MCNC: 1.15 NG/DL (ref 0.93–1.7)
TRIGL SERPL-MCNC: 64 MG/DL (ref 0–150)
TSH SERPL DL<=0.05 MIU/L-ACNC: 1.85 UIU/ML (ref 0.27–4.2)
VLDLC SERPL-MCNC: 14 MG/DL (ref 5–40)
WBC NRBC COR # BLD: 8.2 10*3/MM3 (ref 3.4–10.8)

## 2022-09-03 PROCEDURE — 80053 COMPREHEN METABOLIC PANEL: CPT

## 2022-09-03 PROCEDURE — 80061 LIPID PANEL: CPT

## 2022-09-03 PROCEDURE — 85027 COMPLETE CBC AUTOMATED: CPT

## 2022-09-03 PROCEDURE — 36415 COLL VENOUS BLD VENIPUNCTURE: CPT

## 2022-09-03 PROCEDURE — 84443 ASSAY THYROID STIM HORMONE: CPT

## 2022-09-03 PROCEDURE — 84439 ASSAY OF FREE THYROXINE: CPT

## 2022-09-14 DIAGNOSIS — F90.2 ADHD (ATTENTION DEFICIT HYPERACTIVITY DISORDER), COMBINED TYPE: Chronic | ICD-10-CM

## 2022-10-14 DIAGNOSIS — F90.2 ADHD (ATTENTION DEFICIT HYPERACTIVITY DISORDER), COMBINED TYPE: Chronic | ICD-10-CM

## 2022-10-14 NOTE — TELEPHONE ENCOUNTER
Chart review completed and medication refill approved.   Patient's MEGHAN report reviewed and deemed appropriate.  Patient counseled on use of controlled substances.

## 2022-10-14 NOTE — TELEPHONE ENCOUNTER
Rx Refill Note  Requested Prescriptions     Pending Prescriptions Disp Refills   • lisdexamfetamine (VYVANSE) 50 MG capsule 30 capsule 0     Sig: Take 1 capsule by mouth Every Morning      Last office visit with prescribing clinician: 8/3/2022      Next office visit with prescribing clinician: 2/2/2023            Brenda Cooper MA  10/14/22, 11:12 EDT

## 2022-11-06 ENCOUNTER — TELEMEDICINE (OUTPATIENT)
Dept: FAMILY MEDICINE CLINIC | Facility: TELEHEALTH | Age: 27
End: 2022-11-06

## 2022-11-06 DIAGNOSIS — J06.9 VIRAL URI: Primary | ICD-10-CM

## 2022-11-06 PROBLEM — E66.01 MORBIDLY OBESE: Status: RESOLVED | Noted: 2020-11-13 | Resolved: 2022-11-06

## 2022-11-06 PROCEDURE — 99213 OFFICE O/P EST LOW 20 MIN: CPT | Performed by: NURSE PRACTITIONER

## 2022-11-06 RX ORDER — BROMPHENIRAMINE MALEATE, PSEUDOEPHEDRINE HYDROCHLORIDE, AND DEXTROMETHORPHAN HYDROBROMIDE 2; 30; 10 MG/5ML; MG/5ML; MG/5ML
10 SYRUP ORAL 4 TIMES DAILY PRN
Qty: 280 ML | Refills: 0 | Status: SHIPPED | OUTPATIENT
Start: 2022-11-06 | End: 2023-02-22

## 2022-11-06 RX ORDER — PREDNISONE 10 MG/1
TABLET ORAL DAILY
Qty: 21 EACH | Refills: 0 | Status: SHIPPED | OUTPATIENT
Start: 2022-11-06 | End: 2022-11-12

## 2022-11-06 NOTE — PROGRESS NOTES
Subjective   Chief Complaint   Patient presents with   • Sore Throat          • Nasal Congestion                Shilo Kamara is a 27 y.o. male.     History of Present Illness  Patient reports waking up yesterday with a sore scratchy throat.  He does have some mild postnasal drainage, congestion and infrequent coughing.  He did do a COVID test which was negative.  Sore Throat   This is a new problem. The current episode started yesterday. The problem has been unchanged. There has been no fever. Associated symptoms include congestion and coughing. Pertinent negatives include no abdominal pain, diarrhea, drooling, ear discharge, ear pain, headaches, hoarse voice, plugged ear sensation, neck pain, shortness of breath, stridor, swollen glands, trouble swallowing or vomiting. He has had no exposure to strep. Treatments tried: dayquil.        No Known Allergies    Past Medical History:   Diagnosis Date   • ADHD (attention deficit hyperactivity disorder)    • Hx of balanitis     1 year ago   • Seasonal allergies        Past Surgical History:   Procedure Laterality Date   • SHOULDER SURGERY Left    • SHOULDER SURGERY Left    • TONSILLECTOMY     • WISDOM TOOTH EXTRACTION Left        Social History     Socioeconomic History   • Marital status:    Tobacco Use   • Smoking status: Never   • Smokeless tobacco: Never   Vaping Use   • Vaping Use: Never used   Substance and Sexual Activity   • Alcohol use: Not Currently   • Drug use: No   • Sexual activity: Defer       Family History   Problem Relation Age of Onset   • No Known Problems Mother    • No Known Problems Father    • Dementia Maternal Grandmother    • Drug abuse Cousin    • ADD / ADHD Neg Hx    • Alcohol abuse Neg Hx    • Anxiety disorder Neg Hx    • Bipolar disorder Neg Hx    • Depression Neg Hx    • OCD Neg Hx    • Paranoid behavior Neg Hx    • Schizophrenia Neg Hx    • Seizures Neg Hx    • Self-Injurious Behavior  Neg Hx    • Suicide Attempts Neg Hx           Current Outpatient Medications:   •  brompheniramine-pseudoephedrine-DM 30-2-10 MG/5ML syrup, Take 10 mL by mouth 4 (Four) Times a Day As Needed for Congestion or Cough., Disp: 280 mL, Rfl: 0  •  fluticasone (Flonase) 50 MCG/ACT nasal spray, 2 sprays into the nostril(s) as directed by provider Daily As Needed for Allergies., Disp: 16 g, Rfl: 0  •  lisdexamfetamine (VYVANSE) 50 MG capsule, Take 1 capsule by mouth Every Morning, Disp: 30 capsule, Rfl: 0  •  predniSONE (DELTASONE) 10 MG (21) dose pack, Take  by mouth Daily for 6 days., Disp: 21 each, Rfl: 0      Review of Systems   Constitutional: Negative for chills, diaphoresis, fatigue and fever.   HENT: Positive for congestion, postnasal drip and sore throat. Negative for drooling, ear discharge, ear pain, hoarse voice, swollen glands, trouble swallowing and voice change.    Respiratory: Positive for cough. Negative for chest tightness, shortness of breath, wheezing and stridor.    Cardiovascular: Negative for chest pain.   Gastrointestinal: Negative for abdominal pain, diarrhea and vomiting.   Musculoskeletal: Negative for myalgias and neck pain.   Neurological: Negative for headache.        There were no vitals filed for this visit.    Objective   Physical Exam  Constitutional:       General: He is not in acute distress.     Appearance: Normal appearance. He is not ill-appearing, toxic-appearing or diaphoretic.   HENT:      Head: Normocephalic.      Nose: Congestion present.      Right Sinus: No maxillary sinus tenderness or frontal sinus tenderness.      Left Sinus: No maxillary sinus tenderness or frontal sinus tenderness.      Comments: Per pt       Mouth/Throat:      Lips: Pink.      Mouth: Mucous membranes are moist.   Pulmonary:      Effort: Pulmonary effort is normal.   Neurological:      Mental Status: He is alert and oriented to person, place, and time.   Psychiatric:         Mood and Affect: Mood normal.         Behavior: Behavior normal.           Procedures     Assessment & Plan   Diagnoses and all orders for this visit:    1. Viral URI (Primary)  -     brompheniramine-pseudoephedrine-DM 30-2-10 MG/5ML syrup; Take 10 mL by mouth 4 (Four) Times a Day As Needed for Congestion or Cough.  Dispense: 280 mL; Refill: 0  -     predniSONE (DELTASONE) 10 MG (21) dose pack; Take  by mouth Daily for 6 days.  Dispense: 21 each; Refill: 0        Results for orders placed or performed in visit on 09/03/22   CBC (No Diff)    Specimen: Blood   Result Value Ref Range    WBC 8.20 3.40 - 10.80 10*3/mm3    RBC 5.44 4.14 - 5.80 10*6/mm3    Hemoglobin 16.2 13.0 - 17.7 g/dL    Hematocrit 47.7 37.5 - 51.0 %    MCV 87.7 79.0 - 97.0 fL    MCH 29.8 26.6 - 33.0 pg    MCHC 34.0 31.5 - 35.7 g/dL    RDW 11.9 (L) 12.3 - 15.4 %    RDW-SD 38.2 37.0 - 54.0 fl    MPV 9.3 6.0 - 12.0 fL    Platelets 363 140 - 450 10*3/mm3   TSH    Specimen: Blood   Result Value Ref Range    TSH 1.850 0.270 - 4.200 uIU/mL   T4, Free    Specimen: Blood   Result Value Ref Range    Free T4 1.15 0.93 - 1.70 ng/dL   Lipid Panel    Specimen: Blood   Result Value Ref Range    Total Cholesterol 106 0 - 200 mg/dL    Triglycerides 64 0 - 150 mg/dL    HDL Cholesterol 43 40 - 60 mg/dL    LDL Cholesterol  49 0 - 100 mg/dL    VLDL Cholesterol 14 5 - 40 mg/dL    LDL/HDL Ratio 1.17    Comprehensive Metabolic Panel    Specimen: Blood   Result Value Ref Range    Glucose 100 (H) 65 - 99 mg/dL    BUN 13 6 - 20 mg/dL    Creatinine 1.19 0.76 - 1.27 mg/dL    Sodium 141 136 - 145 mmol/L    Potassium 4.6 3.5 - 5.2 mmol/L    Chloride 103 98 - 107 mmol/L    CO2 26.0 22.0 - 29.0 mmol/L    Calcium 9.8 8.6 - 10.5 mg/dL    Total Protein 7.0 6.0 - 8.5 g/dL    Albumin 4.80 3.50 - 5.20 g/dL    ALT (SGPT) 28 1 - 41 U/L    AST (SGOT) 18 1 - 40 U/L    Alkaline Phosphatase 89 39 - 117 U/L    Total Bilirubin 0.6 0.0 - 1.2 mg/dL    Globulin 2.2 gm/dL    A/G Ratio 2.2 g/dL    BUN/Creatinine Ratio 10.9 7.0 - 25.0    Anion Gap 12.0 5.0 - 15.0 mmol/L     eGFR 86.4 >60.0 mL/min/1.73       PLAN: Discussed dosing, side effects, recommended other symptomatic care.  Patient should follow up with primary care provider, Urgent Care or ER if symptoms worsen, fail to resolve or other symptoms need attention. Patient/family agree to the above.         GILBERT Black     The use of a video visit has been reviewed with the patient and verbal informed consent has been obtained. Myself and Shilo Kamara participated in this visit. The patient is located at [address] [place] [address]. I am located in [place], [place]. Mychart and Zoom were utilized.        This visit was performed via Telehealth.  This patient has been instructed to follow-up with their primary care provider if their symptoms worsen or the treatment provided does not resolve their illness.

## 2022-11-15 DIAGNOSIS — F90.2 ADHD (ATTENTION DEFICIT HYPERACTIVITY DISORDER), COMBINED TYPE: Chronic | ICD-10-CM

## 2022-11-15 NOTE — TELEPHONE ENCOUNTER
Rx Refill Note  Requested Prescriptions     Pending Prescriptions Disp Refills   • lisdexamfetamine (VYVANSE) 50 MG capsule 30 capsule 0     Sig: Take 1 capsule by mouth Every Morning      Last office visit with prescribing clinician: Visit date not found      Next office visit with prescribing clinician: Visit date not found            Brenda Cooper MA  11/15/22, 11:42 EST

## 2022-12-16 DIAGNOSIS — F90.2 ADHD (ATTENTION DEFICIT HYPERACTIVITY DISORDER), COMBINED TYPE: Chronic | ICD-10-CM

## 2023-01-19 DIAGNOSIS — F90.2 ADHD (ATTENTION DEFICIT HYPERACTIVITY DISORDER), COMBINED TYPE: Chronic | ICD-10-CM

## 2023-02-19 NOTE — PROGRESS NOTES
"  Chief Complaint  ADHD, combined type  Subjective          Shilo Kamara presents today to [facility] by himself for a follow up and medication check.     History of Present Illness: Shilo states, \"I am great.\" Shilo tells me he is half way through his graduate degree and will finish in December. He is in an [employer] program and holding a 4.0 GPA. He reports a stable mood and denies anxiety. He reports good focus, concentration, and attention. He continues to work and care for his family. His wife is doing well. He is sleeping and eating well. He is compliant with his medications regiment. He is taking Vyvanse 50 mg daily. He denies any side effects of his medication. He denies any problems with sleep appetite. He denies SI/HI/AVH.    Current Medications:   Current Outpatient Medications   Medication Sig Dispense Refill   • fluticasone (Flonase) 50 MCG/ACT nasal spray 2 sprays into the nostril(s) as directed by provider Daily As Needed for Allergies. 16 g 0   • lisdexamfetamine (VYVANSE) 50 MG capsule Take 1 capsule by mouth Every Morning 30 capsule 0     No current facility-administered medications for this visit.       Objective   Vital Signs:   /94   Pulse 94   Ht 170.2 cm (67\")   Wt 99.8 kg (220 lb)   BMI 34.46 kg/m²     Physical Exam  Vitals and nursing note reviewed.   Constitutional:       Appearance: Normal appearance. He is normal weight.   Musculoskeletal:         General: Normal range of motion.   Skin:     General: Skin is warm and dry.   Neurological:      General: No focal deficit present.      Mental Status: He is alert and oriented to person, place, and time.   Psychiatric:         Attention and Perception: Attention normal.         Mood and Affect: Mood normal.         Speech: Speech normal.         Behavior: Behavior normal. Behavior is cooperative.         Thought Content: Thought content normal.         Cognition and Memory: Cognition " normal.         Judgment: Judgment normal.        Result Review :       The following data was reviewed by GILBERT Cheema on 02/22/2023:     Telemedicine with Ale Charles APRN (11/06/2022)       Assessment and Plan    Problem List Items Addressed This Visit    None  Visit Diagnoses     ADHD (attention deficit hyperactivity disorder), combined type  (Chronic)   -  Primary    Relevant Orders    Urine Drug Screen - Urine, Clean Catch          Mental Status Exam:   Hygiene:   good  Cooperation:  Cooperative  Eye Contact:  Good  Psychomotor Behavior:  Appropriate  Affect:  Appropriate  Mood: normal  Speech:  Normal  Thought Process:  Goal directed and Linear  Thought Content:  Normal  Suicidal:  None  Homicidal:  None  Hallucinations:  None  Delusion:  None  Memory:  Intact  Orientation:  Person, Place, Time and Situation  Reliability:  good  Insight:  Good  Judgement:  Good  Impulse Control:  Good  Physical/Medical Issues:  No      PHQ-9 Score:   PHQ-9 Total Score: 1  Impression/Plan:  -This is a follow up and medication check.  Shilo reports a stable mood and denies anxiety. He reports good focus, concentration, and attention and is getting a 4.0 in his graduate program. He is in [employer]'s classes and will finish in December. He is working and his wife is well. He is sleeping and eating. He is pleased with his medication at this time but reports he has goals to treat ADHD with a non-stimulant after his graduate program is completed. He is aware he needs to complete a UDS for compliance and we will follow up in 6 months or sooner if needed.   -Continue Vyvanse 50 mg daily for ADHD symptoms.  Patient has refills.  -The MEGHAN report, reviewed through PDMP, of the past 12 months were reviewed and is appropriate.  The patient/guardian reports taking the medication only as prescribed.  The patient/guardian denies any abuse or misuse of the medication.  The patient/guardian denies any other substance use or  issues.  There are no apparent substance related issues.  The patient reports no side effects of the current medication usage.  The patient/guardian has reported significant improvement with medication usage and wishes to continue medication as prescribed.  The patient/guardian is appropriate to continue with current medication usage at this time.  Reinforced risks and side effects of medication usage, patient and/or guardian verbalize understanding in their own words and are in agreement with current plan.  -Last UDS 04/20/2022. Appropriate. Order placed for uDS. I am expecting Shilo to be positive for amphetamine and negative for all other substances.     MEDS ORDERED DURING VISIT:  No orders of the defined types were placed in this encounter.        Follow Up   Return in about 6 months (around 8/22/2023) for Medication Check.  Patient was given instructions and counseling regarding his condition or for health maintenance advice. Please see specific information pulled into the AVS if appropriate.       TREATMENT PLAN/GOALS: Continue supportive psychotherapy efforts and medications as indicated. Treatment and medication options discussed during today's visit. Patient acknowledged and verbally consented to continue with current treatment plan and was educated on the importance of compliance with treatment and follow-up appointments.    MEDICATION ISSUES:  Discussed medication options and treatment plan of prescribed medication as well as the risks, benefits, and side effects including potential falls, possible impaired driving and metabolic adversities among others. Patient is agreeable to call the office with any worsening of symptoms or onset of side effects. Patient is agreeable to call 911 or go to the nearest ER should he/she begin having SI/HI.      This document has been electronically signed by GILBERT Sánchez, PMHNP-BC  February 22, 2023 11:45 EST    Part of this note may be an electronic  transcription/translation of spoken language to printed text using the Dragon Dictation System.

## 2023-02-20 DIAGNOSIS — F90.2 ADHD (ATTENTION DEFICIT HYPERACTIVITY DISORDER), COMBINED TYPE: Chronic | ICD-10-CM

## 2023-02-20 NOTE — TELEPHONE ENCOUNTER
Rx Refill Note  Requested Prescriptions     Pending Prescriptions Disp Refills   • lisdexamfetamine (VYVANSE) 50 MG capsule 30 capsule 0     Sig: Take 1 capsule by mouth Every Morning      Last office visit with prescribing clinician: 8/3/2022      Next office visit with prescribing clinician: 2/22/2023            Brenda Cooper MA  02/20/23, 08:43 EST

## 2023-02-22 ENCOUNTER — OFFICE VISIT (OUTPATIENT)
Dept: PSYCHIATRY | Facility: CLINIC | Age: 28
End: 2023-02-22
Payer: COMMERCIAL

## 2023-02-22 ENCOUNTER — LAB (OUTPATIENT)
Dept: LAB | Facility: HOSPITAL | Age: 28
End: 2023-02-22
Payer: COMMERCIAL

## 2023-02-22 VITALS
DIASTOLIC BLOOD PRESSURE: 94 MMHG | HEIGHT: 67 IN | BODY MASS INDEX: 34.53 KG/M2 | WEIGHT: 220 LBS | HEART RATE: 94 BPM | SYSTOLIC BLOOD PRESSURE: 132 MMHG

## 2023-02-22 DIAGNOSIS — F90.2 ADHD (ATTENTION DEFICIT HYPERACTIVITY DISORDER), COMBINED TYPE: ICD-10-CM

## 2023-02-22 DIAGNOSIS — F90.2 ADHD (ATTENTION DEFICIT HYPERACTIVITY DISORDER), COMBINED TYPE: Primary | Chronic | ICD-10-CM

## 2023-02-22 LAB
AMPHET+METHAMPHET UR QL: POSITIVE
AMPHETAMINES UR QL: NEGATIVE
BARBITURATES UR QL SCN: NEGATIVE
BENZODIAZ UR QL SCN: NEGATIVE
BUPRENORPHINE SERPL-MCNC: NEGATIVE NG/ML
CANNABINOIDS SERPL QL: NEGATIVE
COCAINE UR QL: NEGATIVE
METHADONE UR QL SCN: NEGATIVE
OPIATES UR QL: NEGATIVE
OXYCODONE UR QL SCN: NEGATIVE
PCP UR QL SCN: NEGATIVE
PROPOXYPH UR QL: NEGATIVE
TRICYCLICS UR QL SCN: NEGATIVE

## 2023-02-22 PROCEDURE — 99213 OFFICE O/P EST LOW 20 MIN: CPT | Performed by: NURSE PRACTITIONER

## 2023-02-22 PROCEDURE — 80306 DRUG TEST PRSMV INSTRMNT: CPT

## 2023-02-25 ENCOUNTER — TELEMEDICINE (OUTPATIENT)
Dept: FAMILY MEDICINE CLINIC | Facility: TELEHEALTH | Age: 28
End: 2023-02-25
Payer: COMMERCIAL

## 2023-02-25 DIAGNOSIS — H92.02 OTALGIA OF LEFT EAR: Primary | ICD-10-CM

## 2023-02-25 PROCEDURE — 99213 OFFICE O/P EST LOW 20 MIN: CPT | Performed by: NURSE PRACTITIONER

## 2023-02-25 RX ORDER — METHYLPREDNISOLONE 4 MG/1
TABLET ORAL
Qty: 21 TABLET | Refills: 0 | Status: SHIPPED | OUTPATIENT
Start: 2023-02-25

## 2023-02-25 RX ORDER — CEFDINIR 300 MG/1
300 CAPSULE ORAL 2 TIMES DAILY
Qty: 20 CAPSULE | Refills: 0 | Status: SHIPPED | OUTPATIENT
Start: 2023-02-25 | End: 2023-03-07

## 2023-02-25 NOTE — PATIENT INSTRUCTIONS
Otitis Media, Adult  Otitis media occurs when there is inflammation and fluid in the middle ear with signs and symptoms of an acute infection. The middle ear is a part of the ear that contains bones for hearing as well as air that helps send sounds to the brain. When infected fluid builds up in this space, it causes pressure and can lead to an ear infection. The eustachian tube connects the middle ear to the back of the nose (nasopharynx) and normally allows air into the middle ear. If the eustachian tube becomes blocked, fluid can build up and become infected.  What are the causes?  This condition is caused by a blockage in the eustachian tube. This can be caused by mucus or by swelling of the tube. Problems that can cause a blockage include:  A cold or other upper respiratory infection.  Allergies.  An irritant, such as tobacco smoke.  Enlarged adenoids. The adenoids are areas of soft tissue located high in the back of the throat, behind the nose and the roof of the mouth. They are part of the body's defense system (immune system).  A mass in the nasopharynx.  Damage to the ear caused by pressure changes (barotrauma).  What increases the risk?  You are more likely to develop this condition if you:  Smoke or are exposed to tobacco smoke.  Have an opening in the roof of your mouth (cleft palate).  Have gastroesophageal reflux.  Have an immune system disorder.  What are the signs or symptoms?  Symptoms of this condition include:  Ear pain.  Fever.  Decreased hearing.  Tiredness (lethargy).  Fluid leaking from the ear, if the eardrum is ruptured or has burst.  Ringing in the ear.  How is this diagnosed?  This condition is diagnosed with a physical exam. During the exam, your health care provider will use an instrument called an otoscope to look in your ear and check for redness, swelling, and fluid. He or she will also ask about your symptoms.  Your health care provider may also order tests, such as:  A pneumatic  otoscopy. This is a test to check the movement of the eardrum. It is done by squeezing a small amount of air into the ear.  A tympanogram. This is a test that shows how well the eardrum moves in response to air pressure in the ear canal. It provides a graph for your health care provider to review.  How is this treated?  This condition can go away on its own within 3-5 days. But if the condition is caused by a bacterial infection and does not go away on its own, or if it keeps coming back, your health care provider may:  Prescribe antibiotic medicine to treat the infection.  Prescribe or recommend medicines to control pain.  Follow these instructions at home:  Take over-the-counter and prescription medicines only as told by your health care provider.  If you were prescribed an antibiotic medicine, take it as told by your health care provider. Do not stop taking the antibiotic even if you start to feel better.  Keep all follow-up visits. This is important.  Contact a health care provider if:  You have bleeding from your nose.  There is a lump on your neck.  You are not feeling better in 5 days.  You feel worse instead of better.  Get help right away if:  You have severe pain that is not controlled with medicine.  You have swelling, redness, or pain around your ear.  You have stiffness in your neck.  A part of your face is not moving (paralyzed).  The bone behind your ear (mastoid bone) is tender when you touch it.  You develop a severe headache.  Summary  Otitis media is redness, soreness, and swelling of the middle ear, usually resulting in pain and decreased hearing.  This condition can go away on its own within 3-5 days.  If the problem does not go away in 3-5 days, your health care provider may give you medicines to treat the infection.  If you were prescribed an antibiotic medicine, take it as told by your health care provider.  Follow all instructions that were given to you by your health care provider.  This  information is not intended to replace advice given to you by your health care provider. Make sure you discuss any questions you have with your health care provider.  Document Revised: 03/28/2022 Document Reviewed: 03/28/2022  Elsevier Patient Education © 2022 Elsevier Inc.

## 2023-02-25 NOTE — PROGRESS NOTES
You have chosen to receive care through a telehealth visit.  Do you consent to use a video/audio connection for your medical care today? Yes     CHIEF COMPLAINT  No chief complaint on file.        HPI  Shilo Kamara is a 27 y.o. male  presents with complaint of 1 day history of left ear pain with fullness, low grade fever 100, nasal congestion, chills.  He denies cough, runny nose, sore throat.      Review of Systems   See HPI    Past Medical History:   Diagnosis Date   • ADHD (attention deficit hyperactivity disorder)    • Hx of balanitis     1 year ago   • Seasonal allergies        Family History   Problem Relation Age of Onset   • No Known Problems Mother    • No Known Problems Father    • Dementia Maternal Grandmother    • Drug abuse Cousin    • ADD / ADHD Neg Hx    • Alcohol abuse Neg Hx    • Anxiety disorder Neg Hx    • Bipolar disorder Neg Hx    • Depression Neg Hx    • OCD Neg Hx    • Paranoid behavior Neg Hx    • Schizophrenia Neg Hx    • Seizures Neg Hx    • Self-Injurious Behavior  Neg Hx    • Suicide Attempts Neg Hx        Social History     Socioeconomic History   • Marital status:    Tobacco Use   • Smoking status: Never   • Smokeless tobacco: Never   Vaping Use   • Vaping Use: Never used   Substance and Sexual Activity   • Alcohol use: Not Currently   • Drug use: No   • Sexual activity: Defer       Shilo Kamara  reports that he has never smoked. He has never used smokeless tobacco..              There were no vitals taken for this visit.    PHYSICAL EXAM  Physical Exam   Constitutional: He is oriented to person, place, and time. He appears well-developed and well-nourished. He does not have a sickly appearance. He does not appear ill.   HENT:   Head: Normocephalic and atraumatic.   Pulmonary/Chest: Effort normal.  No respiratory distress.  Lymphadenopathy:        Left cervical: Anterior cervical adenopathy present.   Neurological: He is alert and oriented to person,  place, and time.         Diagnoses and all orders for this visit:    1. Otalgia of left ear (Primary)  -     cefdinir (OMNICEF) 300 MG capsule; Take 1 capsule by mouth 2 (Two) Times a Day for 10 days.  Dispense: 20 capsule; Refill: 0  -     methylPREDNISolone (MEDROL) 4 MG dose pack; Take as directed on package instructions.  Dispense: 21 tablet; Refill: 0    --take medications as prescribed  --increase fluids, rest as needed, tylenol or ibuprofen for pain  --f/u in 3-5 days if no improvement        FOLLOW-UP  As discussed during visit with PCP/[facility] if no improvement or Urgent Care/Emergency Department if worsening of symptoms    Patient verbalizes understanding of medication dosage, comfort measures, instructions for treatment and follow-up.    Janny Garibay, APRN  02/25/2023  14:02 EST    The use of a video visit has been reviewed with the patient and verbal informed consent has been obtained. Myself and Shilo Kamara participated in this visit. The patient is located in [address] [place] [address].    I am located in [place], [place]. Mychart and Twilio were utilized. I spent 8 minutes in the patient's chart for this visit.

## 2023-03-01 LAB
REF LAB TEST METHOD: NORMAL
REF LAB TEST METHOD: NORMAL

## 2023-03-21 DIAGNOSIS — F90.2 ADHD (ATTENTION DEFICIT HYPERACTIVITY DISORDER), COMBINED TYPE: Chronic | ICD-10-CM

## 2023-04-20 DIAGNOSIS — F90.2 ADHD (ATTENTION DEFICIT HYPERACTIVITY DISORDER), COMBINED TYPE: Chronic | ICD-10-CM

## 2023-05-24 DIAGNOSIS — F90.2 ADHD (ATTENTION DEFICIT HYPERACTIVITY DISORDER), COMBINED TYPE: Chronic | ICD-10-CM

## 2023-07-29 DIAGNOSIS — F90.2 ADHD (ATTENTION DEFICIT HYPERACTIVITY DISORDER), COMBINED TYPE: Chronic | ICD-10-CM

## 2023-08-19 NOTE — PROGRESS NOTES
"  Chief Complaint  ADHD, combined type  Subjective          Shilo Kamara presents today to [facility] by himself for a follow up and medication check.     History of Present Illness: Shilo states, \"I have great.\"  Shilo tells me he is almost done with his masters program.  He will be finished in December.  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  He is testing a hypothesis that individuals who work hourly may have poor mental health and management.  He is also into national honor societies, maintains a [employer], and is the  for an online program through the [employer].  He continues to work and tells me his job is going well.  He tells me things are great at home.  He does feel he has hit a plateau with Vyvanse.  He tells me there are days where he cannot tell that he has even taken his medication.  He struggles with focus, concentration, and attention on those days.  He denies any changes with sleep or appetite or medical concerns. He is compliant with his medications regiment. He is taking Vyvanse 50 mg daily. He denies any side effects of his medication. He denies any problems with sleep appetite. He denies SI/HI/AVH.    Current Medications:   Current Outpatient Medications   Medication Sig Dispense Refill    fluticasone (Flonase) 50 MCG/ACT nasal spray 2 sprays into the nostril(s) as directed by provider Daily As Needed for Allergies. 16 g 0    Lisdexamfetamine Dimesylate (Vyvanse) 60 MG chewable tablet Chew 1 tablet Daily 30 tablet 0     No current facility-administered medications for this visit.       Objective   Vital Signs:   /90   Pulse 92   Ht 170.2 cm (67\")   Wt 96.2 kg (212 lb)   BMI 33.20 kg/mý     Physical Exam  Vitals and nursing note reviewed.   Constitutional:       Appearance: Normal appearance. He is normal weight.   Musculoskeletal:         General: Normal range of motion.   Skin:     General: " Skin is warm and dry.   Neurological:      General: No focal deficit present.      Mental Status: He is alert and oriented to person, place, and time.   Psychiatric:         Attention and Perception: Attention normal.         Mood and Affect: Mood normal.         Speech: Speech normal.         Behavior: Behavior normal. Behavior is cooperative.         Thought Content: Thought content normal.         Cognition and Memory: Cognition normal.         Judgment: Judgment normal.      Result Review :       The following data was reviewed by GILBERT Cheema on 08/22/2023:       Telemedicine with Janny Garibay APRN (02/25/2023)      Assessment and Plan    Problem List Items Addressed This Visit    None  Visit Diagnoses       ADHD (attention deficit hyperactivity disorder), combined type    -  Primary    Relevant Medications    Lisdexamfetamine Dimesylate (Vyvanse) 60 MG chewable tablet          Mental Status Exam:   Hygiene:   good  Cooperation:  Cooperative  Eye Contact:  Good  Psychomotor Behavior:  Appropriate  Affect:  Appropriate  Mood: normal  Speech:  Normal  Thought Process:  Goal directed and Linear  Thought Content:  Normal  Suicidal:  None  Homicidal:  None  Hallucinations:  None  Delusion:  None  Memory:  Intact  Orientation:  Person, Place, Time and Situation  Reliability:  good  Insight:  Good  Judgement:  Good  Impulse Control:  Good  Physical/Medical Issues:  No      PHQ-9 Score:   PHQ-9 Total Score: 0  Impression/Plan:  -This is a follow up and medication check.  Shilo reports a stable mood and managed anxiety.  He does feel that his Vyvanse is no longer providing adequate coverage for ADHD symptoms.  He has taken the 50 mg dose for at least a year and feels that increasing the dose can provide longer benefits and improved focus, concentration, and attention.  He is finishing his masters degree.  He is still working and caring for his family.  He denies any problems with his medical history or  changes in sleep and appetite.  We discussed increasing Vyvanse to 60 mg and assessing benefits.  He agrees to the change and will follow up in 4 months to discuss.  -Increase Vyvanse to 60 mg daily for ADHD symptoms.  Patient has refills.  -The MEGHAN report, reviewed through PDMP, of the past 12 months were reviewed and is appropriate.  The patient/guardian reports taking the medication only as prescribed.  The patient/guardian denies any abuse or misuse of the medication.  The patient/guardian denies any other substance use or issues.  There are no apparent substance related issues.  The patient reports no side effects of the current medication usage.  The patient/guardian has reported significant improvement with medication usage and wishes to continue medication as prescribed.  The patient/guardian is appropriate to continue with current medication usage at this time.  Reinforced risks and side effects of medication usage, patient and/or guardian verbalize understanding in their own words and are in agreement with current plan.  -Last UDS 02/22/2023. Appropriate.   -The patient has read and signed the [facility] Controlled Substance Contract. We discussed the risks and benefits of the use of controlled substances, including the risk of tolerance and drug dependence. Anorectic medications can be prescribed by one provider at a time and dispensed from one facility at a time, they can only be taken as prescribed, and we are not obligated to refill them if lost or stolen. The refills are only during regular clinic hours. Meghan report was pulled on patient, reviewed and found to be appropriate.            MEDS ORDERED DURING VISIT:  New Medications Ordered This Visit   Medications    Lisdexamfetamine Dimesylate (Vyvanse) 60 MG chewable tablet     Sig: Chew 1 tablet Daily     Dispense:  30 tablet     Refill:  0         Follow Up   Return in about 4 months (around 12/22/2023) for Medication Check.  Patient was  given instructions and counseling regarding his condition or for health maintenance advice. Please see specific information pulled into the AVS if appropriate.       TREATMENT PLAN/GOALS: Continue supportive psychotherapy efforts and medications as indicated. Treatment and medication options discussed during today's visit. Patient acknowledged and verbally consented to continue with current treatment plan and was educated on the importance of compliance with treatment and follow-up appointments.    MEDICATION ISSUES:  Discussed medication options and treatment plan of prescribed medication as well as the risks, benefits, and side effects including potential falls, possible impaired driving and metabolic adversities among others. Patient is agreeable to call the office with any worsening of symptoms or onset of side effects. Patient is agreeable to call 911 or go to the nearest ER should he/she begin having SI/HI.      This document has been electronically signed by GILBERT Sánchez, PMHNP-BC  August 22, 2023 10:32 EDT    Part of this note may be an electronic transcription/translation of spoken language to printed text using the Dragon Dictation System.

## 2023-08-22 ENCOUNTER — TELEPHONE (OUTPATIENT)
Dept: PSYCHIATRY | Facility: CLINIC | Age: 28
End: 2023-08-22

## 2023-08-22 ENCOUNTER — OFFICE VISIT (OUTPATIENT)
Dept: PSYCHIATRY | Facility: CLINIC | Age: 28
End: 2023-08-22
Payer: COMMERCIAL

## 2023-08-22 VITALS
DIASTOLIC BLOOD PRESSURE: 90 MMHG | HEIGHT: 67 IN | SYSTOLIC BLOOD PRESSURE: 124 MMHG | WEIGHT: 212 LBS | HEART RATE: 92 BPM | BODY MASS INDEX: 33.27 KG/M2

## 2023-08-22 DIAGNOSIS — F90.2 ADHD (ATTENTION DEFICIT HYPERACTIVITY DISORDER), COMBINED TYPE: Primary | Chronic | ICD-10-CM

## 2023-08-22 PROCEDURE — 99213 OFFICE O/P EST LOW 20 MIN: CPT | Performed by: NURSE PRACTITIONER

## 2023-08-22 PROCEDURE — 1159F MED LIST DOCD IN RCRD: CPT | Performed by: NURSE PRACTITIONER

## 2023-08-22 PROCEDURE — 1160F RVW MEDS BY RX/DR IN RCRD: CPT | Performed by: NURSE PRACTITIONER

## 2023-08-22 RX ORDER — LISDEXAMFETAMINE DIMESYLATE 60 MG/1
60 TABLET, CHEWABLE ORAL DAILY
Qty: 30 TABLET | Refills: 0 | Status: SHIPPED | OUTPATIENT
Start: 2023-08-22

## 2023-08-29 NOTE — TELEPHONE ENCOUNTER
[facility] did not have Vyvanse 60 MG available. Ensured order had been cancelled at [facility]. Would like sent to [facility] instead. Pharmacy changed in chart.     Rx Refill Note  Requested Prescriptions     Pending Prescriptions Disp Refills    Lisdexamfetamine Dimesylate (Vyvanse) 60 MG chewable tablet 30 tablet 0     Sig: Chew 1 tablet Daily      Last office visit with prescribing clinician: 8/22/2023   Last telemedicine visit with prescribing clinician: Visit date not found   Next office visit with prescribing clinician: 1/23/2024                         Would you like a call back once the refill request has been completed: [] Yes [] No    If the office needs to give you a call back, can they leave a voicemail: [] Yes [] No    Fuentes Bal MA  08/29/23, 09:52 EDT

## 2023-09-23 ENCOUNTER — TELEMEDICINE (OUTPATIENT)
Dept: FAMILY MEDICINE CLINIC | Facility: TELEHEALTH | Age: 28
End: 2023-09-23
Payer: COMMERCIAL

## 2023-09-23 DIAGNOSIS — L81.9 DISCOLORATION OF SKIN OF FOOT: Primary | ICD-10-CM

## 2023-09-23 NOTE — PROGRESS NOTES
You have chosen to receive care through a telehealth visit.  Do you consent to use a video/audio connection for your medical care today? Yes     HPI  Shilo Kamara is a 27 y.o. male  presents with a yellow round smooth lesion on the bottom of his foot. He reports it just appeared and it does not hurt or itch. He denies any activity that would of caused injury.     Review of Systems   Constitutional: Negative.    Skin:  Positive for color change.        Small round lesion on the sole of his foot, orange in color and flat. No itching or pain associated.    Psychiatric/Behavioral: Negative.       Past Medical History:   Diagnosis Date    ADHD (attention deficit hyperactivity disorder)     Hx of balanitis     1 year ago    Seasonal allergies        Family History   Problem Relation Age of Onset    No Known Problems Mother     No Known Problems Father     Dementia Maternal Grandmother     Drug abuse Cousin     ADD / ADHD Neg Hx     Alcohol abuse Neg Hx     Anxiety disorder Neg Hx     Bipolar disorder Neg Hx     Depression Neg Hx     OCD Neg Hx     Paranoid behavior Neg Hx     Schizophrenia Neg Hx     Seizures Neg Hx     Self-Injurious Behavior  Neg Hx     Suicide Attempts Neg Hx        Social History     Socioeconomic History    Marital status:    Tobacco Use    Smoking status: Never     Passive exposure: Never    Smokeless tobacco: Never   Vaping Use    Vaping Use: Never used   Substance and Sexual Activity    Alcohol use: Not Currently    Drug use: Never    Sexual activity: Defer         There were no vitals taken for this visit.    PHYSICAL EXAM  Physical Exam   Constitutional: He is oriented to person, place, and time. He appears well-developed and well-nourished. He has a sickly appearance. He does not appear ill. No distress.   Neurological: He is alert and oriented to person, place, and time.   Skin: Skin is warm, dry and intact. Lesion and rash noted. Rash is macular.   Flat macular lesion  which is round and pea size on sole of foot. No swelling or discharge.    Psychiatric: He has a normal mood and affect.   Vitals reviewed.    Diagnoses and all orders for this visit:    1. Discoloration of skin of foot (Primary)    Advised to use hydrocortisone  cream bid for 3-5 days   If no improvement follow up with PCP for further evaluation and possible biopsy.       FOLLOW-UP  As discussed during visit with [facility], if symptoms worsen or fail to improve, follow-up with PCP/Urgent Care/Emergency Department.    Patient verbalizes understanding of medications, instructions for treatment and follow-up.    Arabella Aguilar, GILBERT  09/23/2023  18:37 EDT    The use of a video visit has been reviewed with the patient and verbal informed consent has been obtained. Myself and Shilo Kamara participated in this visit. The patient is located in  [facility], and I am located in [place], [place]. MyChart and [employer] were utilized.

## 2023-09-29 DIAGNOSIS — F90.2 ADHD (ATTENTION DEFICIT HYPERACTIVITY DISORDER), COMBINED TYPE: Chronic | ICD-10-CM

## 2023-09-29 RX ORDER — LISDEXAMFETAMINE DIMESYLATE 60 MG/1
60 TABLET, CHEWABLE ORAL DAILY
Qty: 30 TABLET | Refills: 0 | Status: SHIPPED | OUTPATIENT
Start: 2023-09-29

## 2023-10-30 DIAGNOSIS — F90.2 ADHD (ATTENTION DEFICIT HYPERACTIVITY DISORDER), COMBINED TYPE: Chronic | ICD-10-CM

## 2023-10-30 RX ORDER — LISDEXAMFETAMINE DIMESYLATE 60 MG/1
60 TABLET, CHEWABLE ORAL DAILY
Qty: 30 TABLET | Refills: 0 | Status: SHIPPED | OUTPATIENT
Start: 2023-10-30

## 2023-11-27 DIAGNOSIS — F90.2 ADHD (ATTENTION DEFICIT HYPERACTIVITY DISORDER), COMBINED TYPE: Chronic | ICD-10-CM

## 2023-11-27 RX ORDER — LISDEXAMFETAMINE DIMESYLATE 60 MG/1
60 TABLET, CHEWABLE ORAL DAILY
Qty: 30 TABLET | Refills: 0 | Status: SHIPPED | OUTPATIENT
Start: 2023-11-27

## 2023-12-27 DIAGNOSIS — F90.2 ADHD (ATTENTION DEFICIT HYPERACTIVITY DISORDER), COMBINED TYPE: Chronic | ICD-10-CM

## 2023-12-27 RX ORDER — LISDEXAMFETAMINE DIMESYLATE 60 MG/1
60 TABLET, CHEWABLE ORAL DAILY
Qty: 30 TABLET | Refills: 0 | Status: SHIPPED | OUTPATIENT
Start: 2023-12-27 | End: 2023-12-28 | Stop reason: SDUPTHER

## 2023-12-28 DIAGNOSIS — F90.2 ADHD (ATTENTION DEFICIT HYPERACTIVITY DISORDER), COMBINED TYPE: Chronic | ICD-10-CM

## 2023-12-28 RX ORDER — LISDEXAMFETAMINE DIMESYLATE 60 MG/1
60 TABLET, CHEWABLE ORAL DAILY
Qty: 30 TABLET | Refills: 0 | Status: SHIPPED | OUTPATIENT
Start: 2023-12-28 | End: 2023-12-29 | Stop reason: SDUPTHER

## 2023-12-28 RX ORDER — LISDEXAMFETAMINE DIMESYLATE 60 MG/1
60 TABLET, CHEWABLE ORAL DAILY
Qty: 30 TABLET | Refills: 0 | OUTPATIENT
Start: 2023-12-28

## 2023-12-28 NOTE — TELEPHONE ENCOUNTER
Pt called and stated that [facility] had informed him the order had been cancelled. Asked who cancelled and they were unsure. Pt stated [facility] right now. [facility]formed [facility] that I would have you resend.     Rx Refill Note  Requested Prescriptions     Pending Prescriptions Disp Refills    Lisdexamfetamine Dimesylate (Vyvanse) 60 MG chewable tablet 30 tablet 0     Sig: Chew 1 tablet Daily      Last office visit with prescribing clinician: 8/22/2023   Last telemedicine visit with prescribing clinician: Visit date not found   Next office visit with prescribing clinician: 1/23/2024                         Would you like a call back once the refill request has been completed: [] Yes [] No    If the office needs to give you a call back, can they leave a voicemail: [] Yes [] No    Fuentes Bal MA  12/28/23, 15:12 EST

## 2023-12-29 DIAGNOSIS — F90.2 ADHD (ATTENTION DEFICIT HYPERACTIVITY DISORDER), COMBINED TYPE: Chronic | ICD-10-CM

## 2023-12-29 RX ORDER — LISDEXAMFETAMINE DIMESYLATE 60 MG/1
60 TABLET, CHEWABLE ORAL DAILY
Qty: 30 TABLET | Refills: 0 | Status: SHIPPED | OUTPATIENT
Start: 2023-12-29

## 2023-12-29 NOTE — TELEPHONE ENCOUNTER
Please send to [facility] in [place]. Updated. Cancelled Rx at [facility].     Rx Refill Note  Requested Prescriptions     Pending Prescriptions Disp Refills    Lisdexamfetamine Dimesylate (Vyvanse) 60 MG chewable tablet 30 tablet 0     Sig: Chew 1 tablet Daily      Last office visit with prescribing clinician: 8/22/2023   Last telemedicine visit with prescribing clinician: Visit date not found   Next office visit with prescribing clinician: 1/23/2024                         Would you like a call back once the refill request has been completed: [] Yes [] No    If the office needs to give you a call back, can they leave a voicemail: [] Yes [] No    Hany Galindo MA  12/29/23, 11:04 EST

## 2023-12-29 NOTE — TELEPHONE ENCOUNTER
Patient's MEGHAN report reviewed and deemed appropriate.  Patient counseled on use of controlled substances.

## 2024-01-02 DIAGNOSIS — F90.2 ADHD (ATTENTION DEFICIT HYPERACTIVITY DISORDER), COMBINED TYPE: Chronic | ICD-10-CM

## 2024-01-02 RX ORDER — LISDEXAMFETAMINE DIMESYLATE 60 MG/1
60 TABLET, CHEWABLE ORAL DAILY
Qty: 30 TABLET | Refills: 0 | Status: SHIPPED | OUTPATIENT
Start: 2024-01-02 | End: 2024-01-03

## 2024-01-02 NOTE — TELEPHONE ENCOUNTER
Pt called and stated that his new insurance has a copay over 200 dollars for Vyvanse and is unable to pay that. States the capsule form is still over 100 for copay. Would like to know if something can be changed or what he can do. Please advise.

## 2024-01-02 NOTE — TELEPHONE ENCOUNTER
Pt called back and stated that [facility] had Vyvanse available for 27 dollars through a discount program. Would like script sent to [facility]. Pharmacy changed in chart. Called [facility] and cancelled order.     Rx Refill Note  Requested Prescriptions     Pending Prescriptions Disp Refills    Lisdexamfetamine Dimesylate (Vyvanse) 60 MG chewable tablet 30 tablet 0     Sig: Chew 1 tablet Daily      Last office visit with prescribing clinician: Visit date not found   Last telemedicine visit with prescribing clinician: Visit date not found   Next office visit with prescribing clinician: Visit date not found                         Would you like a call back once the refill request has been completed: [] Yes [] No    If the office needs to give you a call back, can they leave a voicemail: [] Yes [] No    Fuentes Bal MA  01/02/24, 17:07 EST

## 2024-01-03 ENCOUNTER — TELEPHONE (OUTPATIENT)
Dept: PSYCHIATRY | Facility: CLINIC | Age: 29
End: 2024-01-03

## 2024-01-03 DIAGNOSIS — F90.2 ADHD (ATTENTION DEFICIT HYPERACTIVITY DISORDER), COMBINED TYPE: Primary | ICD-10-CM

## 2024-01-03 RX ORDER — LISDEXAMFETAMINE DIMESYLATE CAPSULES 60 MG/1
60 CAPSULE ORAL EVERY MORNING
Qty: 30 CAPSULE | Refills: 0 | Status: SHIPPED | OUTPATIENT
Start: 2024-01-03 | End: 2024-01-05 | Stop reason: SDUPTHER

## 2024-01-03 NOTE — TELEPHONE ENCOUNTER
Patient called in states that he can't get Generic Vyvanse chewable tablet due to after being approved insurance only pays 70 % he would like us to send the generic Vyvanse capsule to [facility] in [place] due to it will be a lot cheaper. Called [facility] cancelled script for the chewable tablet. I did advise Patient if the generic capsule needed a prior authorization it could take 7-10 business days to get a determination from the insurance company.

## 2024-01-05 DIAGNOSIS — F90.2 ADHD (ATTENTION DEFICIT HYPERACTIVITY DISORDER), COMBINED TYPE: ICD-10-CM

## 2024-01-05 RX ORDER — LISDEXAMFETAMINE DIMESYLATE CAPSULES 60 MG/1
60 CAPSULE ORAL EVERY MORNING
Qty: 30 CAPSULE | Refills: 0 | Status: CANCELLED | OUTPATIENT
Start: 2024-01-05

## 2024-01-05 RX ORDER — LISDEXAMFETAMINE DIMESYLATE CAPSULES 60 MG/1
60 CAPSULE ORAL EVERY MORNING
Qty: 30 CAPSULE | Refills: 0 | Status: SHIPPED | OUTPATIENT
Start: 2024-01-05

## 2024-01-05 NOTE — TELEPHONE ENCOUNTER
Pt called stating that Rx was to be sent to [facility]. Called and cancelled Rx at [facility]. Pharmacy updated in chart. Thanks!       Rx Refill Note  Requested Prescriptions     Pending Prescriptions Disp Refills    lisdexamfetamine (VYVANSE) 60 MG capsule 30 capsule 0     Sig: Take 1 capsule by mouth Every Morning      Last office visit with prescribing clinician: 8/22/2023   Last telemedicine visit with prescribing clinician: Visit date not found   Next office visit with prescribing clinician: 1/23/2024                         Would you like a call back once the refill request has been completed: [] Yes [] No    If the office needs to give you a call back, can they leave a voicemail: [] Yes [] No    Hany Galindo MA  01/05/24, 10:55 EST

## 2024-01-20 NOTE — PROGRESS NOTES
"  Chief Complaint  ADHD, combined type  Subjective          Shilo Kamara presents today to [facility] with wife, Sandie, for a follow up and medication check.     History of Present Illness: Shilo states, \"I am good.\" Shilo tells me he graduated graduate school. He had a 4.0 GPA which is better than he has ever done before staring treatment for his ADHD. He is starting to consider applying for jobs and has a professor who is a mentor helping him. He and his wife would consider moving if he needed too. He reports good focus, concentration, and attention. He is sleeping and eating well. He denies any medical changes.  He is compliant with his medications regiment. He is taking Vyvanse 60 mg daily. He denies any side effects of his medication. He denies SI/HI/AVH.    Current Medications:   Current Outpatient Medications   Medication Sig Dispense Refill    fluticasone (Flonase) 50 MCG/ACT nasal spray 2 sprays into the nostril(s) as directed by provider Daily As Needed for Allergies. 16 g 0    lisdexamfetamine (VYVANSE) 60 MG capsule Take 1 capsule by mouth Every Morning 30 capsule 0     No current facility-administered medications for this visit.       Objective   Vital Signs:   /86   Pulse 98   Ht 170.2 cm (67\")   Wt 99.8 kg (220 lb)   SpO2 99%   BMI 34.46 kg/m²     Physical Exam  Vitals and nursing note reviewed.   Constitutional:       Appearance: Normal appearance. He is normal weight.   Musculoskeletal:         General: Normal range of motion.   Skin:     General: Skin is warm and dry.   Neurological:      General: No focal deficit present.      Mental Status: He is alert and oriented to person, place, and time.   Psychiatric:         Attention and Perception: Attention normal.         Mood and Affect: Mood normal.         Speech: Speech normal.         Behavior: Behavior normal. Behavior is cooperative.         Thought Content: Thought content normal.    "      Cognition and Memory: Cognition normal.         Judgment: Judgment normal.        Result Review :       The following data was reviewed by GILBERT Cheema on 01/23/2024:     Telemedicine with Arabella Aguilar APRN (09/23/2023)   Assessment and Plan    Problem List Items Addressed This Visit    None  Visit Diagnoses       ADHD (attention deficit hyperactivity disorder), combined type  (Chronic)   -  Primary    Relevant Orders    Compliance Drug Analysis, Ur - Urine, Clean Catch    Medication management        Relevant Orders    Compliance Drug Analysis, Ur - Urine, Clean Catch            Mental Status Exam:   Hygiene:   good  Cooperation:  Cooperative  Eye Contact:  Good  Psychomotor Behavior:  Appropriate  Affect:  Appropriate  Mood: normal  Speech:  Normal  Thought Process:  Goal directed and Linear  Thought Content:  Normal  Suicidal:  None  Homicidal:  None  Hallucinations:  None  Delusion:  None  Memory:  Intact  Orientation:  Person, Place, Time and Situation  Reliability:  good  Insight:  Good  Judgement:  Good  Impulse Control:  Good  Physical/Medical Issues:  No      PHQ-9 Score:   PHQ-9 Total Score: 2  Impression/Plan:  -This is a follow up and medication check.  Shilo reports a stable mood and managed anxiety. He reports good focus, concentration, and attention. He graduated graduate school with a 4.0 GPA. He is going to start applying for jobs in his field soon. He is sleeping and eating. He is pleased with his medication regiment and would like to continue at current doses.   -Continue Vyvanse 60 mg daily for ADHD symptoms.  Patient has refills.  -The MEGHAN report, reviewed through PDMP, of the past 12 months were reviewed and is appropriate.  The patient/guardian reports taking the medication only as prescribed.  The patient/guardian denies any abuse or misuse of the medication.  The patient/guardian denies any other substance use or issues.  There are no apparent substance related  issues.  The patient reports no side effects of the current medication usage.  The patient/guardian has reported significant improvement with medication usage and wishes to continue medication as prescribed.  The patient/guardian is appropriate to continue with current medication usage at this time.  Reinforced risks and side effects of medication usage, patient and/or guardian verbalize understanding in their own words and are in agreement with current plan.  -Last UDS 02/22/2023. Appropriate. I will order UDS for compliance.             MEDS ORDERED DURING VISIT:  No orders of the defined types were placed in this encounter.        Follow Up   Return in about 3 months (around 4/23/2024) for Medication Check.  Patient was given instructions and counseling regarding his condition or for health maintenance advice. Please see specific information pulled into the AVS if appropriate.       TREATMENT PLAN/GOALS: Continue supportive psychotherapy efforts and medications as indicated. Treatment and medication options discussed during today's visit. Patient acknowledged and verbally consented to continue with current treatment plan and was educated on the importance of compliance with treatment and follow-up appointments.    MEDICATION ISSUES:  Discussed medication options and treatment plan of prescribed medication as well as the risks, benefits, and side effects including potential falls, possible impaired driving and metabolic adversities among others. Patient is agreeable to call the office with any worsening of symptoms or onset of side effects. Patient is agreeable to call 911 or go to the nearest ER should he/she begin having SI/HI.      This document has been electronically signed by GILBERT Sánchez, PMHNP-BC  January 23, 2024 11:59 EST    Part of this note may be an electronic transcription/translation of spoken language to printed text using the Dragon Dictation System.

## 2024-01-23 ENCOUNTER — OFFICE VISIT (OUTPATIENT)
Dept: PSYCHIATRY | Facility: CLINIC | Age: 29
End: 2024-01-23
Payer: COMMERCIAL

## 2024-01-23 VITALS
OXYGEN SATURATION: 99 % | SYSTOLIC BLOOD PRESSURE: 128 MMHG | WEIGHT: 220 LBS | DIASTOLIC BLOOD PRESSURE: 86 MMHG | BODY MASS INDEX: 34.53 KG/M2 | HEIGHT: 67 IN | HEART RATE: 98 BPM

## 2024-01-23 DIAGNOSIS — Z79.899 MEDICATION MANAGEMENT: ICD-10-CM

## 2024-01-23 DIAGNOSIS — F90.2 ADHD (ATTENTION DEFICIT HYPERACTIVITY DISORDER), COMBINED TYPE: Primary | Chronic | ICD-10-CM

## 2024-01-23 PROCEDURE — 99213 OFFICE O/P EST LOW 20 MIN: CPT | Performed by: NURSE PRACTITIONER

## 2024-01-31 LAB — DRUGS UR: NORMAL

## 2024-02-05 DIAGNOSIS — F90.2 ADHD (ATTENTION DEFICIT HYPERACTIVITY DISORDER), COMBINED TYPE: ICD-10-CM

## 2024-02-05 RX ORDER — LISDEXAMFETAMINE DIMESYLATE CAPSULES 60 MG/1
60 CAPSULE ORAL EVERY MORNING
Qty: 30 CAPSULE | Refills: 0 | Status: SHIPPED | OUTPATIENT
Start: 2024-02-05

## 2024-03-01 ENCOUNTER — TELEMEDICINE (OUTPATIENT)
Dept: FAMILY MEDICINE CLINIC | Facility: TELEHEALTH | Age: 29
End: 2024-03-01
Payer: COMMERCIAL

## 2024-03-01 DIAGNOSIS — H92.03 OTALGIA OF BOTH EARS: Primary | ICD-10-CM

## 2024-03-01 RX ORDER — LORATADINE PSEUDOEPHEDRINE SULFATE 10; 240 MG/1; MG/1
1 TABLET, EXTENDED RELEASE ORAL DAILY
Qty: 20 TABLET | Refills: 0 | Status: SHIPPED | OUTPATIENT
Start: 2024-03-01

## 2024-03-01 RX ORDER — FLUTICASONE PROPIONATE 50 MCG
2 SPRAY, SUSPENSION (ML) NASAL DAILY
Qty: 16 G | Refills: 0 | Status: SHIPPED | OUTPATIENT
Start: 2024-03-01

## 2024-03-01 NOTE — PROGRESS NOTES
You have chosen to receive care through a telehealth visit.  Do you consent to use a video/audio connection for your medical care today? Yes     HPI  Shilo Kamara is a 28 y.o. male  presents with complaint of left ear pain with mild ear drainage with muffled hearing and popping senstation. He reports the right ear is the same but not as bad. He has nasal congestion, sinus congesion and headache in the evenings. Symptoms have been present for 2 days. No fever. He has taken IBU and sinus cold medication OTC which is not helping. Patient wears ear buds daily.       Review of Systems   Constitutional: Negative.    HENT:  Positive for congestion, ear pain (left > right) and sinus pressure.    Respiratory: Negative.     Cardiovascular: Negative.    Gastrointestinal: Negative.    Allergic/Immunologic: Positive for environmental allergies.   Neurological:  Positive for headaches.   Psychiatric/Behavioral: Negative.         Past Medical History:   Diagnosis Date    ADHD (attention deficit hyperactivity disorder)     Hx of balanitis     1 year ago    Seasonal allergies        Family History   Problem Relation Age of Onset    No Known Problems Mother     No Known Problems Father     Dementia Maternal Grandmother         My cayden had dementia but shes the only one in my family to have it.    Drug abuse Cousin     ADD / ADHD Neg Hx     Alcohol abuse Neg Hx     Anxiety disorder Neg Hx     Bipolar disorder Neg Hx     Depression Neg Hx     OCD Neg Hx     Paranoid behavior Neg Hx     Schizophrenia Neg Hx     Seizures Neg Hx     Self-Injurious Behavior  Neg Hx     Suicide Attempts Neg Hx        Social History     Socioeconomic History    Marital status:    Tobacco Use    Smoking status: Never     Passive exposure: Never    Smokeless tobacco: Never   Vaping Use    Vaping Use: Never used   Substance and Sexual Activity    Alcohol use: Not Currently    Drug use: Never    Sexual activity: Yes     Partners: Female      Birth control/protection: Condom         There were no vitals taken for this visit.    PHYSICAL EXAM  Physical Exam   Constitutional: He is oriented to person, place, and time. He appears well-developed and well-nourished. He does not have a sickly appearance. He does not appear ill. No distress.   HENT:   Head: Normocephalic and atraumatic.   Right Ear: Hearing normal.   Left Ear: Hearing normal.   Nose: Congestion present. Right sinus exhibits no maxillary sinus tenderness and no frontal sinus tenderness. Left sinus exhibits no maxillary sinus tenderness and no frontal sinus tenderness.   Mouth/Throat: Mouth/Lips are normal.  Pulmonary/Chest: Effort normal.  No respiratory distress. He no audible wheeze...  Neurological: He is alert and oriented to person, place, and time.   Psychiatric: He has a normal mood and affect.   Vitals reviewed.      Diagnoses and all orders for this visit:    1. Otalgia of both ears (Primary)  -     fluticasone (FLONASE) 50 MCG/ACT nasal spray; 2 sprays into the nostril(s) as directed by provider Daily.  Dispense: 16 g; Refill: 0  -     loratadine-pseudoephedrine (Claritin-D 24 Hour)  MG per 24 hr tablet; Take 1 tablet by mouth Daily.  Dispense: 20 tablet; Refill: 0  -     neomycin-polymyxin-hydrocortisone (CORTISPORIN) 3.5-45844-0 otic solution; Administer 3 drops into both ears 3 (Three) Times a Day.  Dispense: 10 mL; Refill: 0    Suspect possible Otitis Externa  Suspect fluid   Follow up with PCP for in person exam prn no improvement.       FOLLOW-UP  As discussed during visit with [facility], if symptoms worsen or fail to improve, follow-up with PCP/Urgent Care/Emergency Department.    Patient verbalizes understanding of medications, instructions for treatment and follow-up.    Arabella Aguilar, APRN  03/01/2024  10:06 EST    The use of a video visit has been reviewed with the patient and verbal informed consent has been obtained. Myself and Shilo Kamara  participated in this visit. The patient is located in [facility], and I am located in [place], [place]. [employer] and [employer] were utilized.

## 2024-03-03 DIAGNOSIS — F90.2 ADHD (ATTENTION DEFICIT HYPERACTIVITY DISORDER), COMBINED TYPE: ICD-10-CM

## 2024-03-04 RX ORDER — LISDEXAMFETAMINE DIMESYLATE CAPSULES 60 MG/1
60 CAPSULE ORAL EVERY MORNING
Qty: 30 CAPSULE | Refills: 0 | Status: SHIPPED | OUTPATIENT
Start: 2024-03-04

## 2024-04-03 DIAGNOSIS — F90.2 ADHD (ATTENTION DEFICIT HYPERACTIVITY DISORDER), COMBINED TYPE: ICD-10-CM

## 2024-04-03 RX ORDER — LISDEXAMFETAMINE DIMESYLATE CAPSULES 60 MG/1
60 CAPSULE ORAL EVERY MORNING
Qty: 30 CAPSULE | Refills: 0 | Status: SHIPPED | OUTPATIENT
Start: 2024-04-03

## 2024-04-20 NOTE — PROGRESS NOTES
"  Chief Complaint  ADHD, combined type  Subjective          Shilo Kamara presents today to [facility] with wife, Sandie, for a follow up and medication check.     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 His application is under review for the [employer]. He and his wife are planning to move to [place] in June and he wants to work on changing medication to a non-stimulant options prior to the move. There are times he cannot stay asleep, but denies concerns for appetite issues. He denies depression or anxiety.  He is compliant with his medications regiment. He is taking Vyvanse 60 mg daily. He denies any side effects of his medication. He denies SI/HI/AVH.    Current Medications:   Current Outpatient Medications   Medication Sig Dispense Refill    fluticasone (FLONASE) 50 MCG/ACT nasal spray 2 sprays into the nostril(s) as directed by provider Daily. 16 g 0    lisdexamfetamine (VYVANSE) 40 MG capsule Take 1 capsule by mouth Every Morning 30 capsule 0    loratadine-pseudoephedrine (Claritin-D 24 Hour)  MG per 24 hr tablet Take 1 tablet by mouth Daily. 20 tablet 0    Viloxazine HCl  MG capsule sustained-release 24 hr Take 1 capsule by mouth Daily. 30 capsule 2     No current facility-administered medications for this visit.       Objective   Vital Signs:   /84   Pulse 107   Ht 170.2 cm (67\")   Wt 103 kg (226 lb)   SpO2 98%   BMI 35.40 kg/m²     Physical Exam  Vitals and nursing note reviewed.   Constitutional:       Appearance: Normal appearance. He is normal weight.   Musculoskeletal:         General: Normal range of motion.   Skin:     General: Skin is warm and dry.   Neurological:      General: No focal deficit present.      Mental Status: He is alert and oriented to person, place, and time.   Psychiatric:         Attention and Perception: Attention " normal.         Mood and Affect: Mood normal.         Speech: Speech normal.         Behavior: Behavior normal. Behavior is cooperative.         Thought Content: Thought content normal.         Cognition and Memory: Cognition normal.         Judgment: Judgment normal.        Result Review :       The following data was reviewed by GILBERT Cheema on 04/23/2024:       Assessment and Plan    Problem List Items Addressed This Visit    None  Visit Diagnoses       ADHD (attention deficit hyperactivity disorder), combined type  (Chronic)   -  Primary    Relevant Medications    lisdexamfetamine (VYVANSE) 40 MG capsule    Viloxazine HCl  MG capsule sustained-release 24 hr              Mental Status Exam:   Hygiene:   good  Cooperation:  Cooperative  Eye Contact:  Good  Psychomotor Behavior:  Appropriate  Affect:  Appropriate  Mood: normal  Speech:  Normal  Thought Process:  Goal directed and Linear  Thought Content:  Normal  Suicidal:  None  Homicidal:  None  Hallucinations:  None  Delusion:  None  Memory:  Intact  Orientation:  Person, Place, Time and Situation  Reliability:  good  Insight:  Good  Judgement:  Good  Impulse Control:  Good  Physical/Medical Issues:  No      PHQ-9 Score:   PHQ-9 Total Score: 1  Impression/Plan:  -This is a follow up and medication check.  Shilo reports a stable mood and denies anxiety. He reports good focus, concentration, and attention, but has a goal of changing Vyvanse to a non-stimulant, so today we reviewed options. He finds Qelbree to be the best choice for him and would like to try changing prior to his move to [place] in June. He is using Melatonin for sleep, so we reviewed dosing and timing of administration.   -Initiate Qelbree 200 mg daily for ADHD symptoms.   -Decrease Vyvanse to 40 mg daily for ADHD symptoms.    -The MEGHAN report, reviewed through PDMP, of the past 12 months were reviewed and is appropriate.  The patient/guardian reports taking the medication  only as prescribed.  The patient/guardian denies any abuse or misuse of the medication.  The patient/guardian denies any other substance use or issues.  There are no apparent substance related issues.  The patient reports no side effects of the current medication usage.  The patient/guardian has reported significant improvement with medication usage and wishes to continue medication as prescribed.  The patient/guardian is appropriate to continue with current medication usage at this time.  Reinforced risks and side effects of medication usage, patient and/or guardian verbalize understanding in their own words and are in agreement with current plan.  -Last UDS 01/23/2024. Appropriate.             MEDS ORDERED DURING VISIT:  New Medications Ordered This Visit   Medications    lisdexamfetamine (VYVANSE) 40 MG capsule     Sig: Take 1 capsule by mouth Every Morning     Dispense:  30 capsule     Refill:  0    Viloxazine HCl  MG capsule sustained-release 24 hr     Sig: Take 1 capsule by mouth Daily.     Dispense:  30 capsule     Refill:  2         Follow Up   Return in about 6 weeks (around 6/4/2024) for Medication Check.  Patient was given instructions and counseling regarding his condition or for health maintenance advice. Please see specific information pulled into the AVS if appropriate.       TREATMENT PLAN/GOALS: Continue supportive psychotherapy efforts and medications as indicated. Treatment and medication options discussed during today's visit. Patient acknowledged and verbally consented to continue with current treatment plan and was educated on the importance of compliance with treatment and follow-up appointments.    MEDICATION ISSUES:  Discussed medication options and treatment plan of prescribed medication as well as the risks, benefits, and side effects including potential falls, possible impaired driving and metabolic adversities among others. Patient is agreeable to call the office with any worsening  of symptoms or onset of side effects. Patient is agreeable to call 911 or go to the nearest ER should he/she begin having SI/HI.      This document has been electronically signed by GILBERT Sánchez, PMHNP-BC  April 23, 2024 10:30 EDT    Part of this note may be an electronic transcription/translation of spoken language to printed text using the Dragon Dictation System.

## 2024-04-23 ENCOUNTER — TELEPHONE (OUTPATIENT)
Dept: PSYCHIATRY | Facility: CLINIC | Age: 29
End: 2024-04-23

## 2024-04-23 ENCOUNTER — OFFICE VISIT (OUTPATIENT)
Dept: PSYCHIATRY | Facility: CLINIC | Age: 29
End: 2024-04-23
Payer: COMMERCIAL

## 2024-04-23 VITALS
OXYGEN SATURATION: 98 % | DIASTOLIC BLOOD PRESSURE: 84 MMHG | BODY MASS INDEX: 35.47 KG/M2 | HEART RATE: 107 BPM | WEIGHT: 226 LBS | SYSTOLIC BLOOD PRESSURE: 130 MMHG | HEIGHT: 67 IN

## 2024-04-23 DIAGNOSIS — F90.2 ADHD (ATTENTION DEFICIT HYPERACTIVITY DISORDER), COMBINED TYPE: Primary | Chronic | ICD-10-CM

## 2024-04-23 PROCEDURE — 99213 OFFICE O/P EST LOW 20 MIN: CPT | Performed by: NURSE PRACTITIONER

## 2024-04-23 RX ORDER — LISDEXAMFETAMINE DIMESYLATE 40 MG/1
40 CAPSULE ORAL EVERY MORNING
Qty: 30 CAPSULE | Refills: 0 | Status: SHIPPED | OUTPATIENT
Start: 2024-04-23

## 2024-04-23 NOTE — TELEPHONE ENCOUNTER
Can we ask Shilo if he wants to try Strattera? We would decrease Vyvanse to 40 mg as discussed and then 30 mg taper and then start Strattera.

## 2024-04-23 NOTE — TELEPHONE ENCOUNTER
Patient was denied for Qelbree per insurance: Member must try and fail the following drug alternatives: (atomoxetine, methylphenidate).

## 2024-04-23 NOTE — TELEPHONE ENCOUNTER
Spoke to Patient advised of  below he is agreeable to switch to Strattera once he tapers down the Vyvanse.

## 2024-04-23 NOTE — TELEPHONE ENCOUNTER
Insurance is requiring a prior authorization on Qelbree 200 mg. Prior Authorization has been sent will update once determination is sent back.

## 2024-04-23 NOTE — TELEPHONE ENCOUNTER
Called and spoke directly with Pt via phone call. Provided Pt with directions per Provider above. Pt agreeable and verbalized an understanding to all.

## 2024-05-16 ENCOUNTER — TELEMEDICINE (OUTPATIENT)
Dept: FAMILY MEDICINE CLINIC | Facility: TELEHEALTH | Age: 29
End: 2024-05-16
Payer: COMMERCIAL

## 2024-05-16 VITALS — HEIGHT: 67 IN | BODY MASS INDEX: 35.47 KG/M2 | WEIGHT: 226 LBS | TEMPERATURE: 97 F

## 2024-05-16 DIAGNOSIS — J40 BRONCHITIS: Primary | ICD-10-CM

## 2024-05-16 RX ORDER — GUAIFENESIN 600 MG/1
600 TABLET, EXTENDED RELEASE ORAL 2 TIMES DAILY
Qty: 28 TABLET | Refills: 0 | Status: SHIPPED | OUTPATIENT
Start: 2024-05-16 | End: 2024-05-20

## 2024-05-16 RX ORDER — PREDNISONE 20 MG/1
TABLET ORAL
Qty: 13 TABLET | Refills: 0 | Status: SHIPPED | OUTPATIENT
Start: 2024-05-16 | End: 2024-05-20

## 2024-05-16 RX ORDER — DEXTROMETHORPHAN HYDROBROMIDE AND PROMETHAZINE HYDROCHLORIDE 15; 6.25 MG/5ML; MG/5ML
5 SYRUP ORAL NIGHTLY PRN
Qty: 118 ML | Refills: 0 | Status: SHIPPED | OUTPATIENT
Start: 2024-05-16 | End: 2024-05-20

## 2024-05-16 RX ORDER — ALBUTEROL SULFATE 90 UG/1
2 AEROSOL, METERED RESPIRATORY (INHALATION) EVERY 4 HOURS PRN
Qty: 18 G | Refills: 0 | Status: SHIPPED | OUTPATIENT
Start: 2024-05-16

## 2024-05-16 NOTE — PATIENT INSTRUCTIONS
Acute Bronchitis, Adult    Acute bronchitis is sudden inflammation of the main airways (bronchi) that come off the windpipe (trachea) in the lungs. The swelling causes the airways to get smaller and make more mucus than normal. This can make it hard to breathe and can cause coughing or noisy breathing (wheezing).  Acute bronchitis may last several weeks. The cough may last longer. Allergies, asthma, and exposure to smoke may make the condition worse.  What are the causes?  This condition can be caused by germs and by substances that irritate the lungs, including:  Cold and flu viruses. The most common cause of this condition is the virus that causes the common cold.  Bacteria. This is less common.  Breathing in substances that irritate the lungs, including:  Smoke from cigarettes and other forms of tobacco.  Dust and pollen.  Fumes from household cleaning products, gases, or burned fuel.  Indoor or outdoor air pollution.  What increases the risk?  The following factors may make you more likely to develop this condition:  A weak body's defense system, also called the immune system.  A condition that affects your lungs and breathing, such as asthma.  What are the signs or symptoms?  Common symptoms of this condition include:  Coughing. This may bring up clear, yellow, or green mucus from your lungs (sputum).  Wheezing.  Runny or stuffy nose.  Having too much mucus in your lungs (chest congestion).  Shortness of breath.  Aches and pains, including sore throat or chest.  How is this diagnosed?  This condition is usually diagnosed based on:  Your symptoms and medical history.  A physical exam.  You may also have other tests, including tests to rule out other conditions, such as pneumonia. These tests include:  A test of lung function.  Test of a mucus sample to look for the presence of bacteria.  Tests to check the oxygen level in your blood.  Blood tests.  Chest X-ray.  How is this treated?  Most cases of acute  bronchitis clear up over time without treatment. Your health care provider may recommend:  Drinking more fluids to help thin your mucus so it is easier to cough up.  Taking inhaled medicine (inhaler) to improve air flow in and out of your lungs.  Using a vaporizer or a humidifier. These are machines that add water to the air to help you breathe better.  Taking a medicine that thins mucus and clears congestion (expectorant).  Taking a medicine that prevents or stops coughing (cough suppressant).  It is not common to take an antibiotic medicine for this condition.  Follow these instructions at home:    Take over-the-counter and prescription medicines only as told by your health care provider.  Use an inhaler, vaporizer, or humidifier as told by your health care provider.  Take two teaspoons (10 mL) of honey at bedtime to lessen coughing at night.  Drink enough fluid to keep your urine pale yellow.  Do not use any products that contain nicotine or tobacco. These products include cigarettes, chewing tobacco, and vaping devices, such as e-cigarettes. If you need help quitting, ask your health care provider.  Get plenty of rest.  Return to your normal activities as told by your health care provider. Ask your health care provider what activities are safe for you.  Keep all follow-up visits. This is important.  How is this prevented?  To lower your risk of getting this condition again:  Wash your hands often with soap and water for at least 20 seconds. If soap and water are not available, use hand .  Avoid contact with people who have cold symptoms.  Try not to touch your mouth, nose, or eyes with your hands.  Avoid breathing in smoke or chemical fumes. Breathing smoke or chemical fumes will make your condition worse.  Get the flu shot every year.  Contact a health care provider if:  Your symptoms do not improve after 2 weeks.  You have trouble coughing up the mucus.  Your cough keeps you awake at night.  You have  a fever.  Get help right away if you:  Cough up blood.  Feel pain in your chest.  Have severe shortness of breath.  Faint or keep feeling like you are going to faint.  Have a severe headache.  Have a fever or chills that get worse.  These symptoms may represent a serious problem that is an emergency. Do not wait to see if the symptoms will go away. Get medical help right away. Call your local emergency services (911 in the [place] Do not drive yourself to the hospital.  Summary  Acute bronchitis is inflammation of the main airways (bronchi) that come off the windpipe (trachea) in the lungs. The swelling causes the airways to get smaller and make more mucus than normal.  Drinking more fluids can help thin your mucus so it is easier to cough up.  Take over-the-counter and prescription medicines only as told by your health care provider.  Do not use any products that contain nicotine or tobacco. These products include cigarettes, chewing tobacco, and vaping devices, such as e-cigarettes. If you need help quitting, ask your health care provider.  Contact a health care provider if your symptoms do not improve after 2 weeks.  This information is not intended to replace advice given to you by your health care provider. Make sure you discuss any questions you have with your health care provider.  Document Revised: 03/30/2023 Document Reviewed: 04/20/2022  Elsevier Patient Education © 2024 Elsevier Inc.

## 2024-05-16 NOTE — PROGRESS NOTES
You have chosen to receive care through a telehealth visit.  Do you consent to use a video/audio connection for your medical care today? Yes     HPI  Shilo Kamara is a 28 y.o. male  presents with complaint of cough. The patient reports that bronchitis is his main concern as he has been coughing and wheezing for a few days. Additional symptoms that he is having are noted in the ROS portion of this visit. He has had his symptoms for a week. Over the counter he has taken robitussin for his symptoms.    Review of Systems   Constitutional:  Negative for chills, fatigue and fever.   HENT:  Positive for postnasal drip, rhinorrhea and sore throat (resolved). Negative for sinus pressure, sinus pain and sneezing. Congestion: intermittent.   Respiratory:  Positive for cough, chest tightness, shortness of breath and wheezing.         Productive at times yellow   Gastrointestinal:  Negative for diarrhea and nausea.   Musculoskeletal:  Negative for myalgias.   Neurological:  Negative for headaches.       Past Medical History:   Diagnosis Date    ADHD (attention deficit hyperactivity disorder)     Hx of balanitis     1 year ago    Seasonal allergies        Family History   Problem Relation Age of Onset    No Known Problems Mother     No Known Problems Father     Dementia Maternal Grandmother         My cayden had dementia but shes the only one in my family to have it.    Drug abuse Cousin     ADD / ADHD Neg Hx     Alcohol abuse Neg Hx     Anxiety disorder Neg Hx     Bipolar disorder Neg Hx     Depression Neg Hx     OCD Neg Hx     Paranoid behavior Neg Hx     Schizophrenia Neg Hx     Seizures Neg Hx     Self-Injurious Behavior  Neg Hx     Suicide Attempts Neg Hx        Social History     Socioeconomic History    Marital status:    Tobacco Use    Smoking status: Never     Passive exposure: Never    Smokeless tobacco: Never   Vaping Use    Vaping status: Never Used   Substance and Sexual Activity    Alcohol use:  "Not Currently    Drug use: Never    Sexual activity: Yes     Partners: Female     Birth control/protection: Condom       Shilo Kamara  reports that he has never smoked. He has never been exposed to tobacco smoke. He has never used smokeless tobacco. He does not dip.     Temp 97 °F (36.1 °C)   Ht 170.2 cm (67\")   Wt 103 kg (226 lb)   BMI 35.40 kg/m²     PHYSICAL EXAM  Physical Exam   Constitutional: He is oriented to person, place, and time. He appears well-developed.   HENT:   Head: Normocephalic and atraumatic.   Nose: Nose normal.   Eyes: Lids are normal. Right eye exhibits no discharge. Left eye exhibits no discharge. Right conjunctiva is not injected. Left conjunctiva is not injected.   Pulmonary/Chest:  No respiratory distress.  Neurological: He is alert and oriented to person, place, and time. No cranial nerve deficit.   Psychiatric: He has a normal mood and affect. His speech is normal and behavior is normal. Judgment and thought content normal.       Results for orders placed or performed in visit on 01/23/24   Compliance Drug Analysis, Ur - Urine, Clean Catch    Specimen: Urine, Clean Catch    Urine  Release to vonda   Result Value Ref Range    Report Summary FINAL        Diagnoses and all orders for this visit:    1. Bronchitis (Primary)    Other orders  -     predniSONE (DELTASONE) 20 MG tablet; Prednisone 20mg tabs, 3 for 2 days, 2 for 2 days, 1 for 2 days, 1/2 for 2 days take with food or milk  Dispense: 13 tablet; Refill: 0  -     guaiFENesin (Mucinex) 600 MG 12 hr tablet; Take 1 tablet by mouth 2 (Two) Times a Day for 14 days.  Dispense: 28 tablet; Refill: 0  -     promethazine-dextromethorphan (PROMETHAZINE-DM) 6.25-15 MG/5ML syrup; Take 5 mL by mouth At Night As Needed for Cough.  Dispense: 118 mL; Refill: 0  -     albuterol sulfate  (90 Base) MCG/ACT inhaler; Inhale 2 puffs Every 4 (Four) Hours As Needed for Wheezing or Shortness of Air.  Dispense: 18 g; Refill: 0    Take " prednisone with food as early in the day as possible  Do not take prednisone with nsaids such as ibuprofen, aleve, or aspirin  May take tylenol for pain or fever  Mucinex with plenty of fluids especially water to thin secretions and help with congestion.  Do not drive after taking promethazine DM as it may make you drowsy  Albuterol inhaler as needed for shortness of breath or wheezing    FOLLOW-UP  If symptoms worsen or persist follow up with PCP, [facility] or Urgent Care    Patient verbalizes understanding of medication dosage, comfort measures, instructions for treatment and follow-up.    Lida Davidson, APRN  05/16/2024  09:34 EDT    The use of a video visit has been reviewed with the patient and verbal informed consent has been obtained. Myself and Shilo Kamara participated in this visit. The patient is located in [address] [place], [place] [id number].    I am located in [place], [place]. [employer] and [employer] Client were utilized. I spent 28 minutes in the patient's chart for this visit.

## 2024-05-18 NOTE — PROGRESS NOTES
"  Chief Complaint  ADHD, combined type  Subjective          Shilo Kamara presents today to [facility] by himself for a follow up and medication check.     History of Present Illness: Shilo states, \"I had bronchitis last week, but other than that, I am doing well.\" Shilo is preparing to move to [place] with his wife next week. They will be leaving with her grandmother initially and he has a job to start there. He is taking less Vyvanse and remains committed to stopping the stimulant and trying a non-stimulant treatment for ADHD. Qelbree was denied. He is sleeping and eating well.  He is compliant with his medications regiment. He is taking Vyvanse 40 mg daily. He denies any side effects of his medication. He denies SI/HI/AVH.    Current Medications:   Current Outpatient Medications   Medication Sig Dispense Refill    albuterol sulfate  (90 Base) MCG/ACT inhaler Inhale 2 puffs Every 4 (Four) Hours As Needed for Wheezing or Shortness of Air. 18 g 0    fluticasone (FLONASE) 50 MCG/ACT nasal spray 2 sprays into the nostril(s) as directed by provider Daily. 16 g 0    loratadine-pseudoephedrine (Claritin-D 24 Hour)  MG per 24 hr tablet Take 1 tablet by mouth Daily. 20 tablet 0    lisdexamfetamine (VYVANSE) 30 MG capsule Take 1 capsule by mouth Every Morning 30 capsule 0     No current facility-administered medications for this visit.       Objective   Vital Signs:   /84   Pulse 96   Ht 170.2 cm (67\")   Wt 96.6 kg (213 lb)   SpO2 98%   BMI 33.36 kg/m²     Physical Exam  Vitals and nursing note reviewed.   Constitutional:       Appearance: Normal appearance. He is normal weight.   Musculoskeletal:         General: Normal range of motion.   Skin:     General: Skin is warm and dry.   Neurological:      General: No focal deficit present.      Mental Status: He is alert and oriented to person, place, and time.   Psychiatric:         Attention and " Perception: Attention normal.         Mood and Affect: Mood normal.         Speech: Speech normal.         Behavior: Behavior normal. Behavior is cooperative.         Thought Content: Thought content normal.         Cognition and Memory: Cognition normal.         Judgment: Judgment normal.        Result Review :       The following data was reviewed by GILBERT Cheema on 05/20/2024:     Telemedicine with Lida Davidson APRN (05/16/2024)     Assessment and Plan    Problem List Items Addressed This Visit    None  Visit Diagnoses       ADHD (attention deficit hyperactivity disorder), combined type  (Chronic)   -  Primary    Relevant Medications    lisdexamfetamine (VYVANSE) 30 MG capsule                Mental Status Exam:   Hygiene:   good  Cooperation:  Cooperative  Eye Contact:  Good  Psychomotor Behavior:  Appropriate  Affect:  Appropriate  Mood: normal  Speech:  Normal  Thought Process:  Goal directed and Linear  Thought Content:  Normal  Suicidal:  None  Homicidal:  None  Hallucinations:  None  Delusion:  None  Memory:  Intact  Orientation:  Person, Place, Time and Situation  Reliability:  good  Insight:  Good  Judgement:  Good  Impulse Control:  Good  Physical/Medical Issues:  No      PHQ-9 Score:   PHQ-9 Total Score: 0  Impression/Plan:  -This is a follow up and medication check.  Shilo reports a stable mood and denies anxiety. He is committed to adjusting Vyvanse to come off the stimulant and start a non-stimulant for ADHD. He is moving next week, so we will remain in contact over the phone for adjustments, but he agrees to suggestions made today. He is sleeping and eating well.   -Taper to stop Vyvanse. Change dose to 30 mg daily for a week and then every other day for two weeks and then try to stop.   -Upon completion of Vyvanse, initiate Strattera 25 mg daily for a week and then considering increasing to 25 mg twice daily or 50 mg daily for ADHD symptoms.   -The MEGHAN report, reviewed through  PDMP, of the past 12 months were reviewed and is appropriate.  The patient/guardian reports taking the medication only as prescribed.  The patient/guardian denies any abuse or misuse of the medication.  The patient/guardian denies any other substance use or issues.  There are no apparent substance related issues.  The patient reports no side effects of the current medication usage.  The patient/guardian has reported significant improvement with medication usage and wishes to continue medication as prescribed.  The patient/guardian is appropriate to continue with current medication usage at this time.  Reinforced risks and side effects of medication usage, patient and/or guardian verbalize understanding in their own words and are in agreement with current plan.  -Last UDS 01/23/2024. Appropriate.  -I have a remind set to check in with the patient on June 10th about medications.            MEDS ORDERED DURING VISIT:  New Medications Ordered This Visit   Medications    lisdexamfetamine (VYVANSE) 30 MG capsule     Sig: Take 1 capsule by mouth Every Morning     Dispense:  30 capsule     Refill:  0         Follow Up   Return in about 3 months (around 8/20/2024) for Medication Check.  Patient was given instructions and counseling regarding his condition or for health maintenance advice. Please see specific information pulled into the AVS if appropriate.       TREATMENT PLAN/GOALS: Continue supportive psychotherapy efforts and medications as indicated. Treatment and medication options discussed during today's visit. Patient acknowledged and verbally consented to continue with current treatment plan and was educated on the importance of compliance with treatment and follow-up appointments.    MEDICATION ISSUES:  Discussed medication options and treatment plan of prescribed medication as well as the risks, benefits, and side effects including potential falls, possible impaired driving and metabolic adversities among others.  Patient is agreeable to call the office with any worsening of symptoms or onset of side effects. Patient is agreeable to call 911 or go to the nearest ER should he/she begin having SI/HI.      This document has been electronically signed by GILBERT Sánchez, PMHNP-BC  May 20, 2024 12:05 EDT    Part of this note may be an electronic transcription/translation of spoken language to printed text using the Dragon Dictation System.

## 2024-05-20 ENCOUNTER — OFFICE VISIT (OUTPATIENT)
Dept: PSYCHIATRY | Facility: CLINIC | Age: 29
End: 2024-05-20
Payer: COMMERCIAL

## 2024-05-20 VITALS
OXYGEN SATURATION: 98 % | SYSTOLIC BLOOD PRESSURE: 132 MMHG | DIASTOLIC BLOOD PRESSURE: 84 MMHG | HEART RATE: 96 BPM | HEIGHT: 67 IN | WEIGHT: 213 LBS | BODY MASS INDEX: 33.43 KG/M2

## 2024-05-20 DIAGNOSIS — F90.2 ADHD (ATTENTION DEFICIT HYPERACTIVITY DISORDER), COMBINED TYPE: Primary | Chronic | ICD-10-CM

## 2024-05-20 PROCEDURE — 99213 OFFICE O/P EST LOW 20 MIN: CPT | Performed by: NURSE PRACTITIONER

## 2024-05-20 RX ORDER — LISDEXAMFETAMINE DIMESYLATE 30 MG/1
30 CAPSULE ORAL EVERY MORNING
Qty: 30 CAPSULE | Refills: 0 | Status: SHIPPED | OUTPATIENT
Start: 2024-05-20

## 2024-06-05 DIAGNOSIS — F90.2 ADHD (ATTENTION DEFICIT HYPERACTIVITY DISORDER), COMBINED TYPE: Chronic | ICD-10-CM

## 2024-06-05 RX ORDER — LISDEXAMFETAMINE DIMESYLATE 30 MG/1
30 CAPSULE ORAL EVERY MORNING
Qty: 30 CAPSULE | Refills: 0 | OUTPATIENT
Start: 2024-06-05

## 2024-06-05 RX ORDER — ATOMOXETINE 25 MG/1
25 CAPSULE ORAL 2 TIMES DAILY
Qty: 60 CAPSULE | Refills: 2 | Status: SHIPPED | OUTPATIENT
Start: 2024-06-05

## 2024-06-05 NOTE — TELEPHONE ENCOUNTER
Called and spoke with pt. Pt stated that he has a little over a week of Vyvanse left but would like to go ahead and start the Strattera if possible.

## 2024-06-05 NOTE — TELEPHONE ENCOUNTER
Called and spoke with pt. Pt stated had previously discussed switching to Strattera. Would like to see if that's something he can go ahead and do now since medication has to be sent out of state.

## 2024-06-05 NOTE — TELEPHONE ENCOUNTER
[facility] Pt needs appointment for refills. Pt states has new insurance through [employer] but is unsure of the information right now.

## 2024-06-05 NOTE — TELEPHONE ENCOUNTER
Pt Cx apt today  Pt also requesting controlled med to go to a pharmacy outside the state.   *See Pt comment*

## 2024-06-05 NOTE — TELEPHONE ENCOUNTER
I will send now. Patient will start 25 mg daily for 1-2 weeks then increase to a twice daily administration with the second dose being around 1-2 PM. Stop Vyvanse before starting.

## 2024-06-05 NOTE — TELEPHONE ENCOUNTER
Called pt and he has moved to [place]. He said you all had talked about the plan with his medicine.

## 2024-07-09 ENCOUNTER — TELEPHONE (OUTPATIENT)
Dept: PSYCHIATRY | Facility: CLINIC | Age: 29
End: 2024-07-09
Payer: COMMERCIAL

## 2024-07-09 NOTE — TELEPHONE ENCOUNTER
I can send a letter for him but will have to use an anxiety diagnosis because, to my knowledge, there is no evidence that animals help individuals with ADHD. Let me know if he agrees.

## 2024-07-09 NOTE — TELEPHONE ENCOUNTER
Pt called and I spoke with him directly. Pt explained that he recently moved out of state, but is requesting that Provider write a letter to allow Pt to have a cat for ADHD, at his new home. Pt stated that Provider is aware that he moved, and that Pt plans to continue care with Provider when he comes back to [place]. Offered to transfer Pt to make a return apt and Pt declined. Pt stated that he would call back to schedule, once he know what Provider plans to do regarding requested matter. Advised that Pt is out of the office today, but will return tomorrow. Please advise. Thanks!

## 2024-07-10 NOTE — TELEPHONE ENCOUNTER
Patient is agreeable to use anxiety to get the letter. He is aware it can take 2 weeks to complete. He asked that we email the letter to him due to he is unable to  in office.

## 2024-07-30 ENCOUNTER — TELEPHONE (OUTPATIENT)
Dept: PSYCHIATRY | Facility: CLINIC | Age: 29
End: 2024-07-30
Payer: COMMERCIAL

## 2024-07-30 NOTE — TELEPHONE ENCOUNTER
Patient pharmacy sent a prior authorization request for the Atomoxetine 25 mg BID. Pa has been sent to insurance for determination.     Patient has been approved until 07/30/2025 thru insurance.

## 2024-08-31 DIAGNOSIS — F90.2 ADHD (ATTENTION DEFICIT HYPERACTIVITY DISORDER), COMBINED TYPE: Chronic | ICD-10-CM

## 2024-09-04 RX ORDER — ATOMOXETINE 25 MG/1
25 CAPSULE ORAL EVERY 12 HOURS SCHEDULED
Qty: 120 CAPSULE | Refills: 0 | Status: SHIPPED | OUTPATIENT
Start: 2024-09-04

## 2024-09-23 ENCOUNTER — TELEPHONE (OUTPATIENT)
Dept: PSYCHIATRY | Facility: CLINIC | Age: 29
End: 2024-09-23
Payer: COMMERCIAL

## 2024-09-23 DIAGNOSIS — F90.2 ADHD (ATTENTION DEFICIT HYPERACTIVITY DISORDER), COMBINED TYPE: Chronic | ICD-10-CM

## 2024-09-23 RX ORDER — ATOMOXETINE 40 MG/1
40 CAPSULE ORAL 2 TIMES DAILY
Qty: 60 CAPSULE | Refills: 2 | Status: SHIPPED | OUTPATIENT
Start: 2024-09-23

## 2024-11-01 DIAGNOSIS — F90.2 ADHD (ATTENTION DEFICIT HYPERACTIVITY DISORDER), COMBINED TYPE: Chronic | ICD-10-CM

## 2024-11-01 RX ORDER — ATOMOXETINE 40 MG/1
40 CAPSULE ORAL 2 TIMES DAILY
Qty: 60 CAPSULE | Refills: 2 | OUTPATIENT
Start: 2024-11-01

## 2024-11-01 RX ORDER — ATOMOXETINE 25 MG/1
25 CAPSULE ORAL EVERY 12 HOURS SCHEDULED
Qty: 120 CAPSULE | Refills: 0 | OUTPATIENT
Start: 2024-11-01

## 2024-11-06 ENCOUNTER — TELEPHONE (OUTPATIENT)
Dept: PSYCHIATRY | Facility: CLINIC | Age: 29
End: 2024-11-06

## 2024-11-06 NOTE — TELEPHONE ENCOUNTER
Typically, I can send a non-controlled medication to [place]. I am not versed on PA rules for patients living in another state. Patient will need to contact pharmacy and inquire. It may be time that he finds a local provider to manage medication if he is experiencing difficulty getting his medication. [employer] can provide information on local provider for medication management.

## 2024-11-06 NOTE — TELEPHONE ENCOUNTER
Sent PA with information in system and with what info MA provided.  Determination was:  Cannot find matching patient with Name and Date of Birth provided. For additional information, please contact the phone number on the back of the member prescription ID card.    Called Patient back to clarify what address he is at per Patient he is at [address] [place] [address] and has [place] insurance. Please advise if we can even do the prior authorization due to Patient is not in [facility].

## 2024-11-06 NOTE — TELEPHONE ENCOUNTER
Returned a 2nd call back to Pt due to missing his 1st return call.     Pt stated that he is residing in [place], but comes to [place] to have his visits.     Provided insurance information that is through [employer]. Updated in Blue Post-It for our clinic to view if necessary.     Member ID: [id number]  Group #: [id number]  Rx Bin: [id number]  RX Group: [id number]  PCN #: [id number]      Will get clarification before submitting PA.

## 2024-11-06 NOTE — TELEPHONE ENCOUNTER
Pt called and left a VM stating that Strattera Rx required a PA due to being written for 2 pills per day.     Pt has no insurance information on file. Left a VM for Pt to return a call to obtain.

## 2025-01-28 DIAGNOSIS — F90.2 ADHD (ATTENTION DEFICIT HYPERACTIVITY DISORDER), COMBINED TYPE: Chronic | ICD-10-CM

## 2025-01-28 RX ORDER — ATOMOXETINE 40 MG/1
40 CAPSULE ORAL 2 TIMES DAILY
Qty: 60 CAPSULE | Refills: 0 | OUTPATIENT
Start: 2025-01-28